# Patient Record
Sex: FEMALE | Race: WHITE | Employment: FULL TIME | URBAN - METROPOLITAN AREA
[De-identification: names, ages, dates, MRNs, and addresses within clinical notes are randomized per-mention and may not be internally consistent; named-entity substitution may affect disease eponyms.]

---

## 2017-05-15 ENCOUNTER — EMPLOYEE WELLNESS (OUTPATIENT)
Dept: OTHER | Age: 52
End: 2017-05-15

## 2017-05-15 LAB
CHOLESTEROL/HDL RATIO: 3.2
CHOLESTEROL: 176 MG/DL
GLUCOSE BLD-MCNC: 104 MG/DL (ref 70–99)
HDLC SERPL-MCNC: 55 MG/DL
LDL CHOLESTEROL: 101 MG/DL (ref 0–130)
PATIENT FASTING?: ABNORMAL
TRIGL SERPL-MCNC: 101 MG/DL
VLDLC SERPL CALC-MCNC: ABNORMAL MG/DL (ref 1–30)

## 2018-02-26 ENCOUNTER — HOSPITAL ENCOUNTER (OUTPATIENT)
Dept: WOMENS IMAGING | Age: 53
Discharge: HOME | End: 2018-02-28
Payer: COMMERCIAL

## 2018-02-26 DIAGNOSIS — Z12.39 SCREENING BREAST EXAMINATION: ICD-10-CM

## 2018-02-26 PROCEDURE — 77067 SCR MAMMO BI INCL CAD: CPT

## 2018-03-20 VITALS — BODY MASS INDEX: 26.31 KG/M2 | WEIGHT: 163 LBS

## 2018-05-02 ENCOUNTER — EMPLOYEE WELLNESS (OUTPATIENT)
Dept: OTHER | Age: 53
End: 2018-05-02

## 2018-05-02 LAB
CHOLESTEROL/HDL RATIO: 2.9
CHOLESTEROL: 176 MG/DL
GLUCOSE BLD-MCNC: 106 MG/DL (ref 70–99)
HDLC SERPL-MCNC: 60 MG/DL
LDL CHOLESTEROL: 105 MG/DL (ref 0–130)
PATIENT FASTING?: YES
TRIGL SERPL-MCNC: 57 MG/DL
VLDLC SERPL CALC-MCNC: ABNORMAL MG/DL (ref 1–30)

## 2018-05-07 VITALS — WEIGHT: 151 LBS | BODY MASS INDEX: 24.37 KG/M2

## 2019-03-07 ENCOUNTER — HOSPITAL ENCOUNTER (OUTPATIENT)
Age: 54
Discharge: HOME | End: 2019-03-07
Payer: COMMERCIAL

## 2019-03-29 ENCOUNTER — EMPLOYEE WELLNESS (OUTPATIENT)
Dept: OTHER | Age: 54
End: 2019-03-29

## 2019-03-29 LAB
CHOLESTEROL/HDL RATIO: 2.9
CHOLESTEROL: 184 MG/DL
GLUCOSE BLD-MCNC: 98 MG/DL (ref 70–99)
HDLC SERPL-MCNC: 63 MG/DL
LDL CHOLESTEROL: 104 MG/DL (ref 0–130)
PATIENT FASTING?: YES
TRIGL SERPL-MCNC: 86 MG/DL
VLDLC SERPL CALC-MCNC: NORMAL MG/DL (ref 1–30)

## 2019-04-08 VITALS — BODY MASS INDEX: 26.63 KG/M2 | WEIGHT: 165 LBS

## 2019-06-09 PROBLEM — H25.11 NUCLEAR SCLEROTIC CATARACT OF RIGHT EYE: Status: ACTIVE | Noted: 2019-06-09

## 2019-06-09 NOTE — H&P
Audelia Lennox is a 53y.o. year old who presents for elective outpatient ophthalmic surgery with John Talbot. The patient complains of decreased vision, glare and halos around lights, and trouble with vision for activities of daily living. Past Medical History:   Diagnosis Date    Allergic rhinitis     Diverticulitis 2011    PVC (premature ventricular contraction)     Urinary incontinence        Past Surgical History:   Procedure Laterality Date    BREAST SURGERY      LUMP REMOVED FROM RIGHT BREAST    COLONOSCOPY  2011    DILATION AND CURETTAGE OF UTERUS      ENDOMETRIAL ABLATION      INCONTINENCE SURGERY      TONSILLECTOMY  1971    TUBAL LIGATION         Home meds:   Prior to Admission medications    Medication Sig Start Date End Date Taking? Authorizing Provider   predniSONE (DELTASONE) 10 MG tablet Prednisone 10mg tablets: Take 4 tablets by mouth (40mg) on days 1-2. Take 3 tablets by mouth (30mg) on days 3-4. Take 2 tablets by mouth (20mg) on days 5-6. Take 1 tablets by mouth (10mg) on days 6-7.     Dispense 20. 5/31/16   ELIAS Matthew CNP   naproxen (NAPROSYN) 500 MG tablet Take 1 tablet by mouth 2 times daily (with meals) for 7 days 1/14/16 1/21/16  ELIAS Knight CNP   Omega-3 Fatty Acids (FISH OIL) 1200 MG CAPS Take 1 tablet by mouth daily    Historical Provider, MD   magnesium gluconate (MAGONATE) 500 MG tablet Take 500 mg by mouth daily    Historical Provider, MD   metoprolol (TOPROL XL) 50 MG XL tablet Take 1 tablet by mouth daily 11/3/15   Yecenia Song MD   fluticasone [facility]) 50 MCG/ACT nasal spray 2 sprays by Nasal route daily 9/15/15   Yecenia Song MD   montelukast (SINGULAIR) 10 MG tablet Take 1 tablet by mouth nightly 9/15/15   Yecenia Song MD   omeprazole (PRILOSEC) 40 MG capsule Take 40 mg by mouth daily    Historical Provider, MD   Calcium Citrate-Vitamin D (CITRACAL MAXIMUM PO) Take 1 tablet by mouth

## 2019-06-10 ENCOUNTER — HOSPITAL ENCOUNTER (OUTPATIENT)
Age: 54
Setting detail: OUTPATIENT SURGERY
Discharge: HOME | End: 2019-06-10
Attending: OPHTHALMOLOGY | Admitting: OPHTHALMOLOGY
Payer: COMMERCIAL

## 2019-06-10 VITALS
DIASTOLIC BLOOD PRESSURE: 72 MMHG | TEMPERATURE: 98.6 F | SYSTOLIC BLOOD PRESSURE: 121 MMHG | HEIGHT: 66 IN | WEIGHT: 158 LBS | BODY MASS INDEX: 25.39 KG/M2 | OXYGEN SATURATION: 98 % | HEART RATE: 49 BPM | RESPIRATION RATE: 18 BRPM

## 2019-06-10 PROBLEM — H25.11 NUCLEAR SCLEROTIC CATARACT OF RIGHT EYE: Status: RESOLVED | Noted: 2019-06-09 | Resolved: 2019-06-10

## 2019-06-10 PROCEDURE — 2580000003 HC RX 258: Performed by: OPHTHALMOLOGY

## 2019-06-10 PROCEDURE — 6360000002 HC RX W HCPCS: Performed by: OPHTHALMOLOGY

## 2019-06-10 PROCEDURE — 3600000013 HC SURGERY LEVEL 3 ADDTL 15MIN: Performed by: OPHTHALMOLOGY

## 2019-06-10 PROCEDURE — 7100000010 HC PHASE II RECOVERY - FIRST 15 MIN: Performed by: OPHTHALMOLOGY

## 2019-06-10 PROCEDURE — 2709999900 HC NON-CHARGEABLE SUPPLY: Performed by: OPHTHALMOLOGY

## 2019-06-10 PROCEDURE — 6370000000 HC RX 637 (ALT 250 FOR IP): Performed by: OPHTHALMOLOGY

## 2019-06-10 PROCEDURE — 3600000003 HC SURGERY LEVEL 3 BASE: Performed by: OPHTHALMOLOGY

## 2019-06-10 PROCEDURE — 99152 MOD SED SAME PHYS/QHP 5/>YRS: CPT | Performed by: OPHTHALMOLOGY

## 2019-06-10 PROCEDURE — V2632 POST CHMBR INTRAOCULAR LENS: HCPCS | Performed by: OPHTHALMOLOGY

## 2019-06-10 PROCEDURE — 99153 MOD SED SAME PHYS/QHP EA: CPT | Performed by: OPHTHALMOLOGY

## 2019-06-10 PROCEDURE — 7100000011 HC PHASE II RECOVERY - ADDTL 15 MIN: Performed by: OPHTHALMOLOGY

## 2019-06-10 DEVICE — IMPLANTABLE DEVICE: Type: IMPLANTABLE DEVICE | Site: EYE | Status: FUNCTIONAL

## 2019-06-10 RX ORDER — PHENYLEPHRINE HCL 2.5 %
1 DROPS OPHTHALMIC (EYE) SEE ADMIN INSTRUCTIONS
Status: DISCONTINUED | OUTPATIENT
Start: 2019-06-10 | End: 2019-06-10 | Stop reason: HOSPADM

## 2019-06-10 RX ORDER — TETRACAINE HYDROCHLORIDE 5 MG/ML
SOLUTION OPHTHALMIC PRN
Status: DISCONTINUED | OUTPATIENT
Start: 2019-06-10 | End: 2019-06-10 | Stop reason: ALTCHOICE

## 2019-06-10 RX ORDER — SODIUM CHLORIDE 9 MG/ML
INJECTION, SOLUTION INTRAVENOUS CONTINUOUS
Status: DISCONTINUED | OUTPATIENT
Start: 2019-06-10 | End: 2019-06-10 | Stop reason: HOSPADM

## 2019-06-10 RX ORDER — TROPICAMIDE 10 MG/ML
1 SOLUTION/ DROPS OPHTHALMIC SEE ADMIN INSTRUCTIONS
Status: DISCONTINUED | OUTPATIENT
Start: 2019-06-10 | End: 2019-06-10 | Stop reason: HOSPADM

## 2019-06-10 RX ORDER — MIDAZOLAM HYDROCHLORIDE 1 MG/ML
INJECTION INTRAMUSCULAR; INTRAVENOUS PRN
Status: DISCONTINUED | OUTPATIENT
Start: 2019-06-10 | End: 2019-06-10 | Stop reason: ALTCHOICE

## 2019-06-10 RX ORDER — TETRACAINE HYDROCHLORIDE 5 MG/ML
1 SOLUTION OPHTHALMIC SEE ADMIN INSTRUCTIONS
Status: DISCONTINUED | OUTPATIENT
Start: 2019-06-10 | End: 2019-06-10 | Stop reason: HOSPADM

## 2019-06-10 RX ORDER — FENTANYL CITRATE 50 UG/ML
INJECTION, SOLUTION INTRAMUSCULAR; INTRAVENOUS PRN
Status: DISCONTINUED | OUTPATIENT
Start: 2019-06-10 | End: 2019-06-10 | Stop reason: ALTCHOICE

## 2019-06-10 RX ADMIN — PHENYLEPHRINE HYDROCHLORIDE 1 DROP: 25 SOLUTION/ DROPS OPHTHALMIC at 09:14

## 2019-06-10 RX ADMIN — SODIUM CHLORIDE: 9 INJECTION, SOLUTION INTRAVENOUS at 09:13

## 2019-06-10 RX ADMIN — PHENYLEPHRINE HYDROCHLORIDE 1 DROP: 25 SOLUTION/ DROPS OPHTHALMIC at 09:08

## 2019-06-10 RX ADMIN — TROPICAMIDE 1 DROP: 10 SOLUTION/ DROPS OPHTHALMIC at 09:14

## 2019-06-10 RX ADMIN — TROPICAMIDE 1 DROP: 10 SOLUTION/ DROPS OPHTHALMIC at 09:10

## 2019-06-10 ASSESSMENT — PAIN SCALES - GENERAL
PAINLEVEL_OUTOF10: 0
PAINLEVEL_OUTOF10: 0

## 2019-06-10 NOTE — OP NOTE
OPERATIVE NOTE      Patient:  Kaushal Mcclendon    Account #- [de-identified]    YOB: 1965    Date:  6/10/2019    Surgeon:  Trung Gong MD    Primary Care Physician:Phill Edwards MD      Preoperative Diagnosis: Nuclear Sclerotic Cataract- right eye    Postoperative Diagnosis: Same    Procedure: Phacoemulsification with intraocular lens implantation, right eye    Anesthesia: Topical anesthesia with intravenous sedation    Complications: none    Specimens: none    Indications for procedure: The patient is a 53y.o. year old female with decreased vision, glare and halos around lights, and trouble with activities of daily living. Examination revealed a visually significant cataract in the right eye. Other eye diseases have been ruled out as the primary cause of decreased visual acuity. Significant improvement in the patient's visual acuity and/or visual function is expected from the removal of the cataract. Risks, benefits, and alternatives to surgery were discussed with the patient and the patient elected to proceed with phacoemulsification with lens implantation. Details of the procedure: Following informed consent, the patient was identified by name and identification tag in the holding area,taken to the operating room and placed in the supine position. A time out was performed by all present in the room to identify the patient, the eye to be operated on, the correct operative procedure, and the correct intraocular lens. Monitoring leads were placed. The patient was positioned. An eyelid prep of half-strength Betadine was performed. The patient was administered intravenous sedation if desired and topical anesthetic was placed in the operative eye. The eye prepped a second time and draped in the usual sterile fashion using aseptic technique for cataract surgery. A lid speculum was then inserted. [place] was placed onto the corneal surface.   A stab incision was made using a disposable

## 2019-06-10 NOTE — INTERVAL H&P NOTE
H&P Update    Patient's History and Physical from June 9, 2019 was reviewed. Patient examined. There has been no change.     Aj Lim

## 2020-04-16 ENCOUNTER — OFFICE VISIT (OUTPATIENT)
Dept: PRIMARY CARE CLINIC | Age: 55
End: 2020-04-16
Payer: COMMERCIAL

## 2020-04-16 ENCOUNTER — HOSPITAL ENCOUNTER (OUTPATIENT)
Age: 55
Setting detail: SPECIMEN
Discharge: HOME | End: 2020-04-16
Payer: COMMERCIAL

## 2020-04-16 VITALS
WEIGHT: 167 LBS | DIASTOLIC BLOOD PRESSURE: 86 MMHG | SYSTOLIC BLOOD PRESSURE: 132 MMHG | BODY MASS INDEX: 26.95 KG/M2 | TEMPERATURE: 99.8 F | HEART RATE: 76 BPM | OXYGEN SATURATION: 97 %

## 2020-04-16 PROCEDURE — U0002 COVID-19 LAB TEST NON-CDC: HCPCS

## 2020-04-16 PROCEDURE — 99214 OFFICE O/P EST MOD 30 MIN: CPT | Performed by: NURSE PRACTITIONER

## 2020-04-16 NOTE — PATIENT INSTRUCTIONS
Kalina Her SURVEY:    You may be receiving a survey from [employer] regarding your visit today. Please complete the survey to enable us to provide the highest quality of care to you and your family. If you cannot score us a very good on any question, please call the office to discuss how we could have made your experience a very good one. Thank you. Advance Care Planning  People with COVID-19 may have no symptoms, mild symptoms, such as fever, cough, and shortness of breath or they may have more severe illness, developing severe and fatal pneumonia. As a result, Advance Care Planning with attention to naming a health care decision maker (someone you trust to make healthcare decisions for you if you could not speak for yourself) and sharing other health care preferences is important BEFORE a possible health crisis. Please contact your Primary Care Provider to discuss Advance Care Planning. Preventing the Spread of Coronavirus Disease 2019 in Homes and Residential Communities  For the most recent information go to [email or web address]    Prevention steps for People with confirmed or suspected COVID-19 (including persons under investigation) who do not need to be hospitalized  and   People with confirmed COVID-19 who were hospitalized and determined to be medically stable to go home    Your healthcare provider and public health staff will evaluate whether you can be cared for at home. If it is determined that you do not need to be hospitalized and can be isolated at home, you will be monitored by staff from your local or state health department. You should follow the prevention steps below until a healthcare provider or local or state health department says you can return to your normal activities. Stay home except to get medical care  People who are mildly ill with COVID-19 are able to isolate at home during their illness.  You should restrict activities

## 2020-04-16 NOTE — PROGRESS NOTES
Rangel [facility]  1965    Chief Complaint   Patient presents with    Fever     Patient complains of fever up to 101 x 2 days. 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     Fatigue     Patient complains of fatigue x 2 days.  Cough     Patient complains of dry cough.  Generalized Body Aches     Patient complains of body aches. Respiratory Symptoms:  Patient complains of 2 day(s) history of fever: 100.5-101.9, myalgias/arthralgias, post nasal drip, sneezing, chest pain: she said mid chest pain into back , non-productive cough and diarrhea. Symptoms have been unchanged with time. She denies any other symptoms . Relevant PMH: allergic rhinitis. Smoking history:  She  reports that she has never smoked. She has never used smokeless tobacco.     She has had ill contacts with positive Covid patients, she is a respiratory therapist at [employer]. Treatment to date: Acetaminophen. Travel screen completed:  Yes        HPI: Works as a respiratory therapist at [employer].  Exposed last Thursday (4/09/2020) in a room >1 hour with active intubation occurring for a lab confirmed covid positive patient. Did wear Papr lackey and all other PPE. Previously was exposed to positive Covid 19 approximately one month ago wearing N95 and a face shield. Symptoms began over the last 2 days with fevers measuring 101, body aches, dry coughing and a change in sense of taste. She reports symptoms seem to be worsening. Using Tylenol with good results. Vitals:    04/16/20 1348   BP: 132/86   Pulse: 76   Temp: 99.8 °F (37.7 °C)   TempSrc: Oral   SpO2: 97%   Weight: 167 lb (75.8 kg)      Physical Exam  Vitals signs and nursing note reviewed. Constitutional:       Comments: Appears to be of stated age with warm, dry skin; normal coloration without rash of the exposed skin. Patient is well-appearing, well-hydrated, and appears nontoxic without apparent distress.      HENT:

## 2020-04-17 ENCOUNTER — TELEPHONE (OUTPATIENT)
Dept: FAMILY MEDICINE CLINIC | Age: 55
End: 2020-04-17

## 2020-04-17 LAB
SARS-COV-2, PCR: NORMAL
SARS-COV-2: NOT DETECTED
SOURCE: NORMAL

## 2020-04-18 ENCOUNTER — CARE COORDINATION (OUTPATIENT)
Dept: CASE MANAGEMENT | Age: 55
End: 2020-04-18

## 2020-04-20 ENCOUNTER — HOSPITAL ENCOUNTER (OUTPATIENT)
Age: 55
Setting detail: SPECIMEN
Discharge: HOME | End: 2020-04-20
Payer: COMMERCIAL

## 2020-04-20 ENCOUNTER — OFFICE VISIT (OUTPATIENT)
Dept: PRIMARY CARE CLINIC | Age: 55
End: 2020-04-20
Payer: COMMERCIAL

## 2020-04-20 VITALS
WEIGHT: 167 LBS | SYSTOLIC BLOOD PRESSURE: 123 MMHG | TEMPERATURE: 99.9 F | BODY MASS INDEX: 26.95 KG/M2 | OXYGEN SATURATION: 96 % | HEART RATE: 74 BPM | DIASTOLIC BLOOD PRESSURE: 81 MMHG

## 2020-04-20 PROCEDURE — U0002 COVID-19 LAB TEST NON-CDC: HCPCS

## 2020-04-20 PROCEDURE — 99213 OFFICE O/P EST LOW 20 MIN: CPT | Performed by: NURSE PRACTITIONER

## 2020-04-20 RX ORDER — AZITHROMYCIN 250 MG/1
TABLET, FILM COATED ORAL
Qty: 6 TABLET | Refills: 0 | Status: SHIPPED | OUTPATIENT
Start: 2020-04-20 | End: 2021-11-19

## 2020-04-20 NOTE — PROGRESS NOTES
Brandy Mei  1965    Chief Complaint   Patient presents with    Fever     pt still having symptoms, is here for retest for Covid-19, last does of tyelnol was about 3-4 hours ago. Respiratory Symptoms:  Patient complains of 1 week(s) history of fever: 100.5-101.9, myalgias/arthralgias, headache, non-productive cough and diarrhea. Symptoms have been unchanged with time. She denies any other symptoms . Relevant PMH: allergic rhinitis. Smoking history:  She  reports that she has never smoked. She has never used smokeless tobacco.     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 Treatment to date: Acetaminophen. Travel screen completed:  Yes        HPI: Katie Wilkins is a 48-year-old female who presents to clinic with concern for continued fever, cough and increasing fatigue. Katie Wilkins was initially evaluated and tested at this clinic for COVID-19 on April 17 after she began experiencing rapid onset of symptoms including fever, dry cough and fatigue. Bill Singh reports that she was exposed to a known Covid positive patient while at work at [employer] during an active inpatient. She reports that she was wearing her Papr lackey and all her other PPE. She is currently in home based quarantine and off work. Katie Wilkins reports continued dry cough that seems to be increasing in harshness and frequency. Fevers (up to 101) have continued even with Tylenol. She reports times of feeling better, but as soon as she attempts to increase activity and Tylenol wears off she returns to feeling bad. No SOB. She has a follow up appointment with PCP schedule via virtual tomorrow with her PCP.       Vitals:    04/20/20 0921   BP: 123/81   Site: Right Upper Arm   Position: Sitting   Cuff Size: Medium Adult   Pulse: 74   Temp: 99.9 °F (37.7 °C)   TempSrc: Oral   SpO2: 96%   Weight: 167 lb (75.8 kg)      Physical Exam  Constitutional:       General: She is not in acute distress. Appearance: She is ill-appearing. She is not toxic-appearing or diaphoretic. HENT:      Nose: Nose normal.   Cardiovascular:      Rate and Rhythm: Normal rate and regular rhythm. Heart sounds: Normal heart sounds. Pulmonary:      Effort: Pulmonary effort is normal. No respiratory distress. Breath sounds: Rales (minimal; end inspriatory, right mid posterior ) present. No rhonchi. Comments: Frequent dry cough  Chest:      Chest wall: No tenderness. Neurological:      Mental Status: She is alert. MDM:   Sadia Crow is a 63 y.o. female presenting to clinic with concern for COVID 19 exposure and continued cough with fevers. Reviewed and discussed HPI, exam findings and plan of care. Sadia Crow appears nontoxic, well hydrated and well appearing. Due to continued symptoms and concern for a false negative Covid 19 test, will proceed with testing and home based isolation with phone follow up by this clinic in 24-48 hours. Rales on exam today; treating empirically with Azithromycin for concern of CAP. Holding CXR as she is stable and without distress. Discussed and encouraged continued supportive management including good oral hydration, rest and Tylenol for fever and body aches as OTC packaging labelling. Discussed strict follow up precautions to ED for any worsening and or concerns including SOB. Continue virtual visit with PCP as scheduled. Person under investigation (PUI) form was previously completed to be sent. Assessment/Plan:    1. Walking pneumonia  - azithromycin (ZITHROMAX) 250 MG tablet; 2 tablets (500 mg) by mouth on day one.  1 tablet (250 mg) by mouth on days 2-5. Dispense: 6 tablet; Refill: 0. Administration side effects reviewed. Advised to take with food and a probiotic and examples were provided.     2. Exposure to COVID-19 virus  - COVID-19; Future      ELIAS Araujo - CNP  4/20/20     This visit was provided as a focused evaluation during the COVID -19 pandemic/national emergency. A comprehensive review of all previous patient history and testing was not conducted. Pertinent findings were elicited during the visit.

## 2020-04-21 ENCOUNTER — TELEMEDICINE (OUTPATIENT)
Dept: FAMILY MEDICINE CLINIC | Age: 55
End: 2020-04-21
Payer: COMMERCIAL

## 2020-04-21 PROCEDURE — 99213 OFFICE O/P EST LOW 20 MIN: CPT | Performed by: NURSE PRACTITIONER

## 2020-04-21 ASSESSMENT — ENCOUNTER SYMPTOMS
SHORTNESS OF BREATH: 1
DIARRHEA: 0
NAUSEA: 0
COUGH: 1
CONSTIPATION: 0

## 2020-04-21 NOTE — PROGRESS NOTES
tablet Take 500 mg by mouth daily  Historical Provider, MD   fluticasone (FLONASE) 50 MCG/ACT nasal spray 2 sprays by Nasal route daily  Patient not taking: Reported on 4/16/2020  Leah Kaba MD   Calcium Citrate-Vitamin D (CITRACAL MAXIMUM PO) Take 1 tablet by mouth daily  Historical Provider, MD       Social History     Tobacco Use    Smoking status: Never Smoker    Smokeless tobacco: Never Used   Substance Use Topics    Alcohol use: Yes     Comment: RARE    Drug use: No        Allergies   Allergen Reactions    Fish-Derived Products Swelling     Perch only      Epinephrine     Racemic Amphetamine [Amphetamine] Other (See Comments)     Tachycardia & cough   ,   Health Maintenance   Topic Date Due    Hepatitis C screen  1965    HIV screen  03/18/1979    DTaP/Tdap/Td vaccine (1 - Tdap) 03/18/1983    Cervical cancer screen  10/01/2013    Shingles Vaccine (1 of 2) 03/18/2014    A1C test (Diabetic or Prediabetic)  09/18/2014    Breast cancer screen  02/26/2020    Colon cancer screen colonoscopy  04/06/2021    Lipid screen  03/29/2024    Flu vaccine  Completed    Hepatitis A vaccine  Aged Out    Hepatitis B vaccine  Aged Out    Hib vaccine  Aged Out    Meningococcal (ACWY) vaccine  Aged Out    Pneumococcal 0-64 years Vaccine  Aged Out       PHYSICAL EXAMINATION:  [ INSTRUCTIONS:  \"[x]\" Indicates a positive item  \"[]\" Indicates a negative item  -- DELETE ALL ITEMS NOT EXAMINED]  Vital Signs: (As obtained by patient/caregiver or practitioner observation)    Blood pressure-  Heart rate-    Respiratory rate-    Temperature-  Pulse oximetry-     Constitutional: [x] Appears well-developed and well-nourished [x] No apparent distress      [] Abnormal-   Mental status  [x] Alert and awake  [x] Oriented to person/place/time []Able to follow commands      Eyes:  EOM    []  Normal  [] Abnormal-  Sclera  []  Normal  [] Abnormal -         Discharge []  None visible  [] Abnormal -    HENT:   []

## 2020-04-22 ENCOUNTER — CARE COORDINATION (OUTPATIENT)
Dept: CASE MANAGEMENT | Age: 55
End: 2020-04-22

## 2020-04-22 LAB
SARS-COV-2, PCR: DETECTED
SARS-COV-2: ABNORMAL
SOURCE: ABNORMAL

## 2020-04-23 ENCOUNTER — CARE COORDINATION (OUTPATIENT)
Dept: CARE COORDINATION | Age: 55
End: 2020-04-23

## 2020-04-23 NOTE — CARE COORDINATION
Yellow alert noted in remote COVID-19 Loop Symptom Monitoring Program. Patient was messaged yesterday, 4/22, to notify Loida Green if symptoms have worsened since yesterday. Loop Conversation:  4/22Srikanth Issa, 2:30 PM  I am COVID positive with pneumonia. I've been sick for the past week. I was started on a Z Pack this past monday and I'm starting to improve. reply     Elmer Carroll LPN, 3:92 PM  Hi, I'm Alyssa Richardson LPN from the [address] [place] [address],[address] [facility] Loop Care Team. I see you alerted about having Low Energy Level. Thank you for the update. Is your energy level the same or worse than yesterday?

## 2020-04-25 ENCOUNTER — CARE COORDINATION (OUTPATIENT)
Dept: OTHER | Facility: CLINIC | Age: 55
End: 2020-04-25

## 2020-04-25 NOTE — CARE COORDINATION
Patient commented in LOOP: My energy level is worse than yesterday. Have a sinus headache today. I took two short walkes in the yard yesterday and that really wiped me out. I don't feel I've recovered from it yet. RN: Oral Tello, thank you for reaching out to us. Please go ahead and get some rest today and see if that will help with those symptoms. If they worsen please let us know or contact your provider. Have a great day!

## 2020-04-26 ENCOUNTER — CARE COORDINATION (OUTPATIENT)
Dept: OTHER | Facility: CLINIC | Age: 55
End: 2020-04-26

## 2020-04-27 ENCOUNTER — CARE COORDINATION (OUTPATIENT)
Dept: OTHER | Facility: CLINIC | Age: 55
End: 2020-04-27

## 2020-04-28 ENCOUNTER — CARE COORDINATION (OUTPATIENT)
Dept: OTHER | Facility: CLINIC | Age: 55
End: 2020-04-28

## 2020-04-29 ENCOUNTER — HOSPITAL ENCOUNTER (OUTPATIENT)
Age: 55
Setting detail: SPECIMEN
Discharge: HOME | End: 2020-04-29
Payer: COMMERCIAL

## 2020-04-29 PROCEDURE — U0002 COVID-19 LAB TEST NON-CDC: HCPCS

## 2020-04-30 LAB
SARS-COV-2, PCR: NORMAL
SARS-COV-2, RAPID: NORMAL
SARS-COV-2: NOT DETECTED
SOURCE: NORMAL

## 2020-05-01 ENCOUNTER — HOSPITAL ENCOUNTER (OUTPATIENT)
Age: 55
Setting detail: SPECIMEN
Discharge: HOME | End: 2020-05-01
Payer: COMMERCIAL

## 2020-05-01 PROCEDURE — U0003 INFECTIOUS AGENT DETECTION BY NUCLEIC ACID (DNA OR RNA); SEVERE ACUTE RESPIRATORY SYNDROME CORONAVIRUS 2 (SARS-COV-2) (CORONAVIRUS DISEASE [COVID-19]), AMPLIFIED PROBE TECHNIQUE, MAKING USE OF HIGH THROUGHPUT TECHNOLOGIES AS DESCRIBED BY CMS-2020-01-R: HCPCS

## 2020-05-04 LAB — SARS-COV-2, NAA: NOT DETECTED

## 2020-05-05 ENCOUNTER — TELEPHONE (OUTPATIENT)
Dept: PRIMARY CARE CLINIC | Age: 55
End: 2020-05-05

## 2020-05-05 ENCOUNTER — TELEPHONE (OUTPATIENT)
Dept: FAMILY MEDICINE CLINIC | Age: 55
End: 2020-05-05

## 2021-07-15 LAB
CHOLESTEROL/HDL RATIO: 2.7
CHOLESTEROL: 172 MG/DL
GLUCOSE BLD-MCNC: 93 MG/DL (ref 70–99)
HDLC SERPL-MCNC: 63 MG/DL
LDL CHOLESTEROL: 95 MG/DL (ref 0–130)
PATIENT FASTING?: YES
TRIGL SERPL-MCNC: 69 MG/DL
VLDLC SERPL CALC-MCNC: NORMAL MG/DL (ref 1–30)

## 2021-11-19 ENCOUNTER — OFFICE VISIT (OUTPATIENT)
Dept: PRIMARY CARE CLINIC | Age: 56
End: 2021-11-19
Payer: COMMERCIAL

## 2021-11-19 VITALS
DIASTOLIC BLOOD PRESSURE: 93 MMHG | WEIGHT: 172 LBS | HEART RATE: 79 BPM | OXYGEN SATURATION: 98 % | BODY MASS INDEX: 27.64 KG/M2 | HEIGHT: 66 IN | TEMPERATURE: 98 F | RESPIRATION RATE: 20 BRPM | SYSTOLIC BLOOD PRESSURE: 131 MMHG

## 2021-11-19 DIAGNOSIS — T50.B95A ADVERSE REACTION TO COVID-19 VACCINE: Primary | ICD-10-CM

## 2021-11-19 DIAGNOSIS — L30.9 HAND DERMATITIS: ICD-10-CM

## 2021-11-19 PROCEDURE — 99213 OFFICE O/P EST LOW 20 MIN: CPT | Performed by: NURSE PRACTITIONER

## 2021-11-19 PROCEDURE — 3017F COLORECTAL CA SCREEN DOC REV: CPT | Performed by: NURSE PRACTITIONER

## 2021-11-19 PROCEDURE — G8484 FLU IMMUNIZE NO ADMIN: HCPCS | Performed by: NURSE PRACTITIONER

## 2021-11-19 PROCEDURE — 1036F TOBACCO NON-USER: CPT | Performed by: NURSE PRACTITIONER

## 2021-11-19 PROCEDURE — G8427 DOCREV CUR MEDS BY ELIG CLIN: HCPCS | Performed by: NURSE PRACTITIONER

## 2021-11-19 PROCEDURE — G8419 CALC BMI OUT NRM PARAM NOF/U: HCPCS | Performed by: NURSE PRACTITIONER

## 2021-11-19 RX ORDER — PREDNISONE 20 MG/1
TABLET ORAL
Qty: 15 TABLET | Refills: 0 | Status: SHIPPED | OUTPATIENT
Start: 2021-11-19 | End: 2022-02-11 | Stop reason: ALTCHOICE

## 2021-11-19 NOTE — PATIENT INSTRUCTIONS
Patient Education        COVID-19 Vaccine: Care Instructions  Overview     The COVID-19 vaccine can help you avoid getting COVID-19, a disease caused by a type of coronavirus. COVID-19 can cause pneumonia and even death. You may need 1 or 2 doses of the vaccine. And you might need \"booster\" doses later to help you stay protected. It takes two weeks after your last dose to be fully protected from COVID-19. The vaccine prevents most cases of COVID-19. But if you do still catch COVID-19, your symptoms will probably be less severe than if you hadn't gotten the vaccine. You can't get COVID-19 from the vaccine. What are the side effects of the COVID-19 vaccine? You might not have side effects. But if you do, they'll probably be like those of other vaccines, including:  · Fever. · Soreness. · Feeling very tired. This is normal. Your body is building protection against COVID-19. You may also have other side effects, including:  · Chills. · Headache. · Pain, redness, a rash, or swelling in the arm where you had the vaccine. · Swollen lymph nodes in the armpit of the arm where you had the vaccine. · Nausea. Side effects will likely go away in a few days. Until then, it may be harder to do your usual activities. You may need 1 or 2 doses. If you get 2 doses, you may notice side effects more after the second dose. If you think you've been exposed to COVID-19 or have symptoms like a cough, trouble breathing, or a new loss of smell or taste, call your doctor. These aren't vaccine side effects. You may need a COVID-19 test.  Follow-up care is a key part of your treatment and safety. Be sure to make and go to all appointments, and call your doctor if you are having problems. It's also a good idea to know your test results and keep a list of the medicines you take. How can you care for yourself at home?   · If you have a sore arm or a fever after getting the COVID-19 vaccine, you can take an over-the-counter pain medicine, such as acetaminophen (Tylenol) or ibuprofen (Advil, Motrin). Read and follow all instructions on the label. Do not give aspirin to anyone younger than 20. It has been linked to Reye syndrome, a serious illness. · Put ice or a cold pack on the sore area for 10 to 20 minutes at a time. Put a thin cloth between the ice and your skin. · If you have side effects, such as a fever, be sure to get enough rest and drink plenty of fluids. When should you call for help? Call 911  anytime you think you may need emergency care. For example, call if after getting the COVID-19 vaccine:    · You have symptoms of a severe reaction to the vaccine. Symptoms of a severe reaction may include:  ? Severe difficulty breathing. ? Sudden raised, red areas (hives) all over your body. ? Severe lightheadedness. Call your doctor now or seek immediate medical care if:    · You have one or more of these symptoms within several weeks of getting a COVID-19 vaccine. ? Severe headache that does not go away. ? Blurred vision. ? Shortness of breath. ? Chest pain, or a feeling of a fast-beating, fluttering, or pounding heart. ? Abdominal pain that does not go away. ? Pain, redness, or swelling in the leg.  ? Bruising or tiny spots under the skin that's not near where you got the vaccine. Watch closely for changes in your health, and be sure to contact your doctor if you have any problems. Where can you learn more? Go to https://[employer]. org and sign in to your [employer] account. Enter C126 in the [employer]Wave box to learn more about \"COVID-19 Vaccine: Care Instructions. \"     If you do not have an account, please click on the \"Sign Up Now\" link. Current as of: July 1, 2021               Content Version: 13.0  © 2006-2021 [employer]. Care instructions adapted under license by [employer].  If you have questions about a medical condition or this instruction, always ask your healthcare professional. [place] [address] any warranty or liability for your use of this information. Patient Education        Side Effects of Medicine: Care Instructions  Your Care Instructions     Medicines are a big part of treatment for many health problems. But all medicines have side effects. Often these are mild problems. They might include a dry mouth or upset stomach. But sometimes medicines can cause dangerous side effects. One example is a bad allergic reaction. The best treatment will depend on what side effects you have. If you have a serious side effect, you may need to stop taking the medicine. You may also need to take another medicine to treat the side effect. If you have a mild side effect, it may go away after you take the medicine for a while. The doctor has checked you carefully, but problems can develop later. If you notice any problems or new symptoms, get medical treatment right away. Follow-up care is a key part of your treatment and safety. Be sure to make and go to all appointments, and call your doctor if you are having problems. It's also a good idea to know your test results and keep a list of the medicines you take. How can you care for yourself at home? · Be safe with medicines. Take your medicines exactly as prescribed. Call your doctor if you think you are having a problem with your medicine. · Call your doctor if side effects bother you and you wonder if you should keep taking a medicine. Your doctor may be able to lower your dose or change your medicine. Do not suddenly quit taking your medicine unless a doctor tells you to. · Make sure your doctor has a list of all the medicines, vitamins, supplements, and herbal remedies you take. Ask about side effects. When should you call for help?   Watch closely for changes in your health, and be sure to contact your doctor if:    · You think you are having a new problem with your medicine.     · You do not infection you already have. Tell your doctor about any illness or infection you've had within the past several weeks. Tell your doctor if you have ever had:  · heart problems, high blood pressure, or a heart attack;  · glaucoma or cataracts;  · herpes infection of the eyes;  · past or present tuberculosis;  · a parasite infection that causes diarrhea (such as threadworms);  · any illness that causes diarrhea;  · underactive thyroid;  · diabetes;  · a stomach ulcer, diverticulitis;  · a colostomy or ileostomy;  · osteoporosis or low bone mineral density (steroid medication can increase your risk of bone loss);  · low levels of calcium or potassium in your blood;  · cirrhosis or other liver disease;  · mental illness or psychosis; or  · a muscle disorder such as myasthenia gravis. Long-term use of steroids may lead to bone loss (osteoporosis), especially if you smoke or drink alcohol, if you do not exercise, or if you do not get enough vitamin D or calcium in your diet. It is not known whether this medicine will harm an unborn baby. Tell your doctor if you are pregnant or plan to become pregnant. You should not breastfeed while using prednisone. How should I take prednisone? Follow all directions on your prescription label and read all medication guides or instruction sheets. Your doctor may occasionally change your dose. Use the medicine exactly as directed. Prednisone is taken daily or every other day, depending on the condition being treated. You may need to take the medicine at a certain time of day. Follow your doctor's instructions about when and how often to take this medicine. Take with food if prednisone upsets your stomach. Measure liquid medicine carefully. Use the dosing syringe provided, or use a medicine dose-measuring device (not a kitchen spoon). Swallow the delayed-release tablet whole and do not crush, chew, or break it.   Prednisone can weaken (suppress) your immune system, and you may get an infection more easily. Call your doctor if you have signs of infection (fever, weakness, cold or flu symptoms, skin sores, diarrhea, frequent or recurring illness). If you have major surgery or a severe injury or infection, your prednisone dose needs may change. Make sure any doctor caring for you knows you are using this medicine. If you use this medicine long-term, you may need medical tests and vision exams. In case of emergency, wear or carry medical identification to let others know you use a steroid. You should not stop using prednisone suddenly. Follow your doctor's instructions about tapering your dose. Store at room temperature away from moisture, heat, and light. What happens if I miss a dose? Take the medicine as soon as you can, but skip the missed dose if it is almost time for your next dose. Do not take two doses at one time. What happens if I overdose? Seek emergency medical attention or call the Poison Help line at 1-697.191.3021. High doses or long-term use of prednisone can lead to thinning skin, easy bruising, changes in body fat (especially in your face, neck, back, and waist), increased acne or facial hair, menstrual problems, impotence, or loss of interest in sex. What should I avoid while taking prednisone? Do not receive a \"live\" vaccine while using prednisone. The vaccine may not work as well and may not fully protect you from disease. Live vaccines include measles, mumps, rubella (MMR), polio, rotavirus, typhoid, yellow fever, varicella (chickenpox), zoster (shingles), and nasal flu (influenza) vaccine. Avoid being near people who are sick or have infections. Call your doctor for preventive treatment if you are exposed to chickenpox or measles. These conditions can be serious or even fatal in people who are using steroid medicine. Avoid drinking alcohol. What are the possible side effects of prednisone?   Get emergency medical help if you have signs of an allergic reaction: hives; difficult breathing; swelling of your face, lips, tongue, or throat. Call your doctor at once if you have:  · muscle pain or weakness;  · blurred vision, tunnel vision, eye pain, or seeing halos around lights;  · severe depression, changes in personality, unusual thoughts or behavior;  · bloody or tarry stools, coughing up blood or vomit that looks like coffee grounds;  · swelling, rapid weight gain, feeling short of breath;  · irregular heartbeats;  · severe headache, pounding in your neck or ears;  · decreased adrenal gland hormones --muscle weakness, tiredness, diarrhea, nausea, menstrual changes, skin discoloration, craving salty foods, and feeling light-headed; or  · low potassium level --leg cramps, constipation, irregular heartbeats, fluttering in your chest, increased thirst or urination, numbness or tingling, muscle weakness or limp feeling. Prednisone can affect growth in children. Tell your doctor if your child is not growing at a normal rate while using this medicine. Common side effects may include:  · weight gain (especially in your face or your upper back and torso);  · increased appetite;  · mood changes, trouble sleeping;  · changes in your menstrual periods;  · problems with memory or thought;  · muscle or joint pain;  · weakness;  · headache, dizziness, spinning sensation;  · nausea, bloating, loss of appetite;  · slow wound healing; or  · acne, increased sweating, thinning skin, bruising, pinpoint spots under your skin. This is not a complete list of side effects and others may occur. Call your doctor for medical advice about side effects. You may report side effects to FDA at 6-520-FDA-0244. What other drugs will affect prednisone? Sometimes it is not safe to use certain medications at the same time. Some drugs can affect your blood levels of other drugs you take, which may increase side effects or make the medications less effective.   Tell your doctor about all your current medicines. Many drugs can affect prednisone, especially:  · bupropion;  · cyclosporine;  · digoxin;  · ketoconazole;  · an antibiotic;  · birth control pills or hormone replacement therapy;  · a diuretic or \"water pill\";  · insulin or oral diabetes medicine;  · a blood thinner --warfarin, Coumadin, Jantoven; or  · NSAIDs (nonsteroidal anti-inflammatory drugs) --aspirin, ibuprofen (Advil, Motrin), naproxen (Aleve), celecoxib, diclofenac, indomethacin, meloxicam, and others. This list is not complete and many other drugs may affect prednisone. This includes prescription and over-the-counter medicines, vitamins, and herbal products. Not all possible drug interactions are listed here. Where can I get more information? Your pharmacist can provide more information about prednisone. Remember, keep this and all other medicines out of the reach of children, never share your medicines with others, and use this medication only for the indication prescribed. Every effort has been made to ensure that the information provided by [address] [place], [place] [address]can Dr is accurate, up-to-date, and complete, but no guarantee is made to that effect. Drug information contained herein may be time sensitive. [facility] information has been compiled for use by healthcare practitioners and consumers in the [place] and therefore [facility] does not warrant that uses outside of the [place] are appropriate, unless specifically indicated otherwise. [facility]'s drug information does not endorse drugs, diagnose patients or recommend therapy. [facility] drug information is an informational resource designed to assist licensed healthcare practitioners in caring for their patients and/or to serve consumers viewing this service as a supplement to, and not a substitute for, the expertise, skill, knowledge and judgment of healthcare practitioners.  The absence of a warning for a given drug or drug combination in no way should be construed to indicate that the drug or drug combination is safe, effective or appropriate for any given patient. [facility] does not assume any responsibility for any aspect of healthcare administered with the aid of information [facility] provides. The information contained herein is not intended to cover all possible uses, directions, precautions, warnings, drug interactions, allergic reactions, or adverse effects. If you have questions about the drugs you are taking, check with your doctor, nurse or pharmacist.  Copyright 6866-2385 [address]. Version: 10.01. Revision date: 3/28/2019. Care instructions adapted under license by [facility] ([facility]). If you have questions about a medical condition or this instruction, always ask your healthcare professional. [place] [address] any warranty or liability for your use of this information.

## 2021-11-19 NOTE — PROGRESS NOTES
Chief Complaint:   Rash (Got covid vaccine on tues 11-16. Started feeling ithcy same day, all over hands/neck area. Pt is concerned about getting the 2nd covid shot)      History of Present Illness   Source of history provided by:  patient. Aaron Thompson is a 61 y.o. old female who has a past medical history of:   Past Medical History:   Diagnosis Date    Allergic rhinitis     Diverticulitis 2011    PVC (premature ventricular contraction)     Urinary incontinence     Presents to the walk in clinic for evaluation of a rash to the \"itches all over. It comes and goes since I got the vaccine. It's burning and itching\". According to Ana Lilia Carcamo, she received her first [employer] vaccination on Tuesday November 16,2021 at the [facility]. Wednesday, she was awakened from sleep with \"spiked a fever, all of my joints hurt, hands were burning and itchy\". She has tried over the counter Claritin without relief. Denies any known cause for the rash including any new soaps, detergents, lotions, foods, or medications. Since onset the symptoms have progressed. Reports associated erythema, mild burning, and pruritis. Denies any bleeding or drainage. Denies any lymphangitic streaking,  HA , dyspnea, dysphagia, recent illness, myalgias, vomiting, or lethargy. ROS   Unless otherwise stated in this report or unable to obtain because of the patient's clinical or mental status as evidenced by the medical record, this patients's positive and negative responses for Review of Systems, constitutional, psych, eyes, ENT, cardiovascular, respiratory, gastrointestinal, neurological, genitourinary, musculoskeletal, integument systems and systems related to the presenting problem are either stated in the preceding or were not pertinent or were negative for the symptoms and/or complaints related to the medical problem. Past Surgical History:  has a past surgical history that includes Breast surgery;  Tonsillectomy (1971); Tubal ligation; Dilation and curettage of uterus; Endometrial ablation; Incontinence surgery; Colonoscopy (2011); and Intracapsular cataract extraction (Right, 6/10/2019). Social History:  reports that she has never smoked. She has never used smokeless tobacco. She reports current alcohol use. She reports that she does not use drugs. Family History: family history includes Cancer in her mother; Diabetes in her mother; Heart Disease in her father. Allergies: Fish-derived products, Epinephrine, and Racemic amphetamine [amphetamine]    Physical Exam     VS:  BP (!) 131/93   Pulse 79   Temp 98 °F (36.7 °C)   Resp 20   Ht 5' 6\" (1.676 m)   Wt 172 lb (78 kg)   SpO2 98%   BMI 27.76 kg/m²        Constitutional:  Alert, development consistent with age. HEENT:  NC/NT. Airway patent. Eyes:  PERRL, EOMI, no discharge. Lungs:  CTAB, no wheezing, rales, or rhonchi  Heart:  RRR without pathologic murmurs  Skin:  Normal turgor and appropriately dry to touch. Erythematous, non raised, blanching rash noted over the bilateral palms and anterior wrists. Signs of excoriation noted but no signs of secondary infection including TTP, pustules, induration, or fluctuance. No bleeding or discharge noted. No lymphangitic streaking. Neurological:  Orientation age-appropriate unless noted elsewhere. Motor functions intact. Lab / Imaging Results   (All laboratory and radiology results have been personally reviewed by myself)  Labs:      Imaging: All Radiology results interpreted by Radiologist unless otherwise noted. Medical Decision Making   Pt non-toxic, in no apparent distress and stable at time of discharge. Assessment / Plan   Impression(s):  Rosie Abdalla was seen today for rash. Diagnoses and all orders for this visit:    Adverse reaction to COVID-19 vaccine  -     predniSONE (DELTASONE) 20 MG tablet; Take 3 tablets by mouth (60mg) x3 days. Take 2 tablets by mouth (40mg) days 4 and 5.   Take 1 tablets by mouth (20mg) days 6 and 7. Hand dermatitis      61y.o. year old female presenting with Covid-19 vaccination side effect. Treating today with prednisone taper,administraiton/side effects discussed. Avoid scratching to prevent the development of a secondary infection. F/u PCP and employee health in 3-5 days if symptoms persist. ED for any worsening or concerns.     Shan Laurent, ELIAS - CNP

## 2021-12-03 ENCOUNTER — OFFICE VISIT (OUTPATIENT)
Dept: FAMILY MEDICINE CLINIC | Age: 56
End: 2021-12-03
Payer: COMMERCIAL

## 2021-12-03 VITALS
OXYGEN SATURATION: 97 % | RESPIRATION RATE: 18 BRPM | DIASTOLIC BLOOD PRESSURE: 80 MMHG | HEART RATE: 80 BPM | HEIGHT: 66 IN | BODY MASS INDEX: 27.58 KG/M2 | SYSTOLIC BLOOD PRESSURE: 138 MMHG | WEIGHT: 171.6 LBS

## 2021-12-03 DIAGNOSIS — L29.8 PRURITIC ERYTHEMATOUS RASH: Primary | ICD-10-CM

## 2021-12-03 DIAGNOSIS — Z12.11 SCREENING FOR COLON CANCER: ICD-10-CM

## 2021-12-03 DIAGNOSIS — Z86.16 HISTORY OF COVID-19: ICD-10-CM

## 2021-12-03 DIAGNOSIS — J30.1 SEASONAL ALLERGIC RHINITIS DUE TO POLLEN: ICD-10-CM

## 2021-12-03 DIAGNOSIS — Z12.4 SCREENING FOR CERVICAL CANCER: ICD-10-CM

## 2021-12-03 DIAGNOSIS — Z00.00 ENCOUNTER FOR MEDICAL EXAMINATION TO ESTABLISH CARE: ICD-10-CM

## 2021-12-03 DIAGNOSIS — Z12.31 ENCOUNTER FOR SCREENING MAMMOGRAM FOR MALIGNANT NEOPLASM OF BREAST: ICD-10-CM

## 2021-12-03 DIAGNOSIS — T50.Z95D VACCINE REACTION, SUBSEQUENT ENCOUNTER: ICD-10-CM

## 2021-12-03 PROCEDURE — G8484 FLU IMMUNIZE NO ADMIN: HCPCS | Performed by: STUDENT IN AN ORGANIZED HEALTH CARE EDUCATION/TRAINING PROGRAM

## 2021-12-03 PROCEDURE — 99214 OFFICE O/P EST MOD 30 MIN: CPT | Performed by: STUDENT IN AN ORGANIZED HEALTH CARE EDUCATION/TRAINING PROGRAM

## 2021-12-03 PROCEDURE — G8419 CALC BMI OUT NRM PARAM NOF/U: HCPCS | Performed by: STUDENT IN AN ORGANIZED HEALTH CARE EDUCATION/TRAINING PROGRAM

## 2021-12-03 PROCEDURE — 1036F TOBACCO NON-USER: CPT | Performed by: STUDENT IN AN ORGANIZED HEALTH CARE EDUCATION/TRAINING PROGRAM

## 2021-12-03 PROCEDURE — G8427 DOCREV CUR MEDS BY ELIG CLIN: HCPCS | Performed by: STUDENT IN AN ORGANIZED HEALTH CARE EDUCATION/TRAINING PROGRAM

## 2021-12-03 PROCEDURE — 3017F COLORECTAL CA SCREEN DOC REV: CPT | Performed by: STUDENT IN AN ORGANIZED HEALTH CARE EDUCATION/TRAINING PROGRAM

## 2021-12-03 SDOH — ECONOMIC STABILITY: FOOD INSECURITY: WITHIN THE PAST 12 MONTHS, YOU WORRIED THAT YOUR FOOD WOULD RUN OUT BEFORE YOU GOT MONEY TO BUY MORE.: NEVER TRUE

## 2021-12-03 SDOH — ECONOMIC STABILITY: FOOD INSECURITY: WITHIN THE PAST 12 MONTHS, THE FOOD YOU BOUGHT JUST DIDN'T LAST AND YOU DIDN'T HAVE MONEY TO GET MORE.: NEVER TRUE

## 2021-12-03 ASSESSMENT — ENCOUNTER SYMPTOMS
BACK PAIN: 0
VOMITING: 0
ABDOMINAL PAIN: 0
COUGH: 0
SINUS PAIN: 0
RHINORRHEA: 0
NAUSEA: 0
WHEEZING: 0
DIARRHEA: 0

## 2021-12-03 ASSESSMENT — PATIENT HEALTH QUESTIONNAIRE - PHQ9
SUM OF ALL RESPONSES TO PHQ QUESTIONS 1-9: 0
SUM OF ALL RESPONSES TO PHQ9 QUESTIONS 1 & 2: 0
2. FEELING DOWN, DEPRESSED OR HOPELESS: 0
SUM OF ALL RESPONSES TO PHQ QUESTIONS 1-9: 0
1. LITTLE INTEREST OR PLEASURE IN DOING THINGS: 0
SUM OF ALL RESPONSES TO PHQ QUESTIONS 1-9: 0

## 2021-12-03 ASSESSMENT — SOCIAL DETERMINANTS OF HEALTH (SDOH): HOW HARD IS IT FOR YOU TO PAY FOR THE VERY BASICS LIKE FOOD, HOUSING, MEDICAL CARE, AND HEATING?: NOT HARD AT ALL

## 2021-12-03 NOTE — PROGRESS NOTES
HPI Notes    Name: Amelia Contreras  : 1965         Chief Complaint:     Chief Complaint   Patient presents with    New Patient     would like to see a provider [place] home     Immunizations     2016 recieved her first covid vaccine states he was extremly tired , felt itchy on hr chest and throat  states her hand hand hives on her hands        History of Present Illness:      HPI    This is a 61year old woman presenting to establish care with a new PCP. She is transitioning due to our office being closer to her home. Her previous PCP was Dr. Breanna Galdamez in [facility]. She has also seen Claudia Fuchs NP at the same office. This was about 4-5 years ago. Records are available. Her PMH includes only allergic rhinitis, which is currently treated with Claritin PRN to good effect. She has also had recurrent idiopathic hives and other \"allergic reactions\" \"randomly throughout the years\". She had once been treated with immunotherapy from All[facility], but this had been discontinued years ago. She describes facial, eye swelling from touching many various different things, naming walnuts in the yard, dust, mouse droppings etc.     Preventative: has had diagnostic colon scope about 12 years ago. She had one polyp removed at this time. Would like to do Cologuard. She has not done screening mammography for several years, and has never had an abnormal one. She would prefer to schedule this herself. She has had DM and lipid screening earlier this year, which was normal.     Her principle concern today is to follow up from her previous visit with walk-in for an adverse vaccine reaction. She had received a Pfizer COVID-19 vaccine on 2021 and shortly after developed a pruritic erythematous rash over her palms and volar wrists. In addition to these symptoms, she reported marked fatigue, arthralgias, elevation in temperature. She was treated at the walk in clinic with a prednisone taper.  Since then, she reports feeling better, but has occasionally had a recurrent itchy feeling over her chest, anterior throat. She reports that she coughs easier if she is \"running around at work\", but overall notes that she is improving each day. Additionally, she noted that when she performed a PFT on herself at work, her DLCO was 77%, (previous value 90-95%) and there was reportedly a 10% reduction in her FVC (still within normal range). Past Medical History:     Past Medical History:   Diagnosis Date    Allergic rhinitis     Diverticulitis 2011    PVC (premature ventricular contraction)     PVC (premature ventricular contraction)     Stress incontinence 5/28/2014    Urinary incontinence     Yeast vaginitis 5/28/2014      Reviewed all health maintenance requirements and ordered appropriate tests  Health Maintenance Due   Topic Date Due    Hepatitis C screen  Never done    HIV screen  Never done    DTaP/Tdap/Td vaccine (1 - Tdap) Never done    Cervical cancer screen  Never done    Shingles Vaccine (1 of 2) Never done    Breast cancer screen  02/26/2020    Colon cancer screen colonoscopy  04/06/2021    Flu vaccine (1) 09/01/2021    COVID-19 Vaccine (2 - Pfizer 3-dose booster series) 12/07/2021       Past Surgical History:     Past Surgical History:   Procedure Laterality Date    BREAST SURGERY      LUMP REMOVED FROM RIGHT BREAST    COLONOSCOPY  2011    DILATION AND CURETTAGE OF UTERUS      ENDOMETRIAL ABLATION      INCONTINENCE SURGERY      INTRACAPSULAR CATARACT EXTRACTION Right 6/10/2019    EYE CATARACT EMULSIFICATION IOL IMPLANT performed by Jakub Starr MD at [id number] [place] [address]          Medications:       Prior to Admission medications    Medication Sig Start Date End Date Taking?  Authorizing Provider   Probiotic Product (PROBIOTIC DAILY PO)  4/1/20  Yes Historical Provider, MD   magnesium gluconate (MAGONATE) 500 MG tablet Take 500 mg by mouth daily   Yes Historical Provider, MD ear canal and external ear normal. There is no impacted cerumen. Left Ear: Tympanic membrane, ear canal and external ear normal. There is no impacted cerumen. Nose: Nose normal.      Mouth/Throat:      Mouth: Mucous membranes are moist.      Pharynx: Oropharynx is clear. No posterior oropharyngeal erythema. Eyes:      Conjunctiva/sclera: Conjunctivae normal.      Pupils: Pupils are equal, round, and reactive to light. Cardiovascular:      Rate and Rhythm: Normal rate and regular rhythm. Pulses: Normal pulses. Heart sounds: Normal heart sounds. No murmur heard. Pulmonary:      Effort: Pulmonary effort is normal.      Breath sounds: Normal breath sounds. No wheezing. Abdominal:      General: Abdomen is flat. Palpations: Abdomen is soft. Tenderness: There is no abdominal tenderness. Musculoskeletal:      Cervical back: Normal range of motion and neck supple. Skin:     General: Skin is warm and dry. Capillary Refill: Capillary refill takes less than 2 seconds. Neurological:      General: No focal deficit present. Mental Status: She is alert and oriented to person, place, and time. Mental status is at baseline.    Psychiatric:         Mood and Affect: Mood normal.         Behavior: Behavior normal.                 Data:     Lab Results   Component Value Date     07/06/2015    K 4.0 07/06/2015     07/06/2015    CO2 28 07/06/2015    BUN 12 07/06/2015    CREATININE 0.67 07/06/2015    GLUCOSE 93 07/15/2021    GLUCOSE 96 09/07/2011    AST 28 09/18/2013    ALT 36 09/18/2013     Lab Results   Component Value Date    WBC 5.9 07/06/2015    RBC 4.33 07/06/2015    HGB 12.3 07/06/2015    HCT 37.8 07/06/2015    MCV 87.4 07/06/2015    MCH 28.5 07/06/2015    MCHC 32.6 07/06/2015    RDW 13.8 07/06/2015     07/06/2015    MPV NOT REPORTED 07/06/2015     No results found for: TSH  Lab Results   Component Value Date    CHOL 172 07/15/2021    HDL 63 07/15/2021 LABA1C 5.8 09/18/2013          Assessment & Plan        Diagnosis Orders   1. Pruritic erythematous rash     2. Vaccine reaction, subsequent encounter     3. Seasonal allergic rhinitis due to pollen     4. Encounter for medical examination to establish care     5. History of COVID-19     6. Screening for colon cancer  Cologuard (For External Results Only)   7. Encounter for screening mammogram for malignant neoplasm of breast     8. Screening for cervical cancer       1. Differential diagnosis include idiopathic urticaria, contact dermatitis, localized lymphocytic response. Timing does not raise concern for a true vaccine reaction. Her symptoms are not consistent with true anaphylaxis, and I think that it would be likely safe for her to receive her second dose of her Pfizer vaccine, however I do feel it is reasonable to lonnie her a temporary vaccine exemption while I research this further. I would like to conduct a literature review for currently known COVID-19 vaccine adverse reactions, as well as discuss the matter with at least one other physician. If literature review and subsequent investigation reveals no concern for a potential life-threatening adverse reaction, I would feel confident recommending that she receive her second Pfizer dose. 3. Would recommend continued treatment with Claritin as needed, Flonase. 6. Will order Cologuard, as she is at average risk. 7. Would recommend she get a screening mammogram. She requests to schedule this herself. 8. I would recommend that get a PAP smear for repeat screening for cervical CA. She may have this done at either her previous GYN's office or my office. Follow up in one year for next year's AWV          Completed Refills   Requested Prescriptions      No prescriptions requested or ordered in this encounter     No follow-ups on file. No orders of the defined types were placed in this encounter.     Orders Placed This Encounter   Procedures    [employer] (For External Results Only)     This test is performed by an external laboratory and is used for result attachment only. It is required that this order requisition be faxed to: [facility] @ [phone]. See [email or web address] for further information. Standing Status:   Future     Standing Expiration Date:   12/3/2022         Patient Instructions     SURVEY:    You may be receiving a survey from [employer] regarding your visit today. Please complete the survey to enable us to provide the highest quality of care to you and your family. If you cannot score us a very good on any question, please call the office to discuss how we could of made your experience a very good one. Thank you. Clinical Care Team:     Dr. Maynor Mcbride, [facility]      ClericalTeam:     Jennifer Clayton      Electronically signed by Irwin Morris DO on 12/3/2021 at 11:25 AM           Completed Refills   Requested Prescriptions      No prescriptions requested or ordered in this encounter         Ame Frances received counseling on the following healthy behaviors: nutrition, exercise and medication adherence  Reviewed prior labs and health maintenance. Continue current medications, diet and exercise. Discussed use, benefit, and side effects of prescribed medications. Barriers to medication compliance addressed. Patient given educational materials - see patient instructions. All patient questions answered. Patient voiced understanding.

## 2021-12-03 NOTE — PATIENT INSTRUCTIONS
SURVEY:    You may be receiving a survey from [employer] regarding your visit today. Please complete the survey to enable us to provide the highest quality of care to you and your family. If you cannot score us a very good on any question, please call the office to discuss how we could of made your experience a very good one. Thank you.       Clinical Care Team:     Dr. Benita Sadler, [place]      ClericalTeam:     [id number] [facility]

## 2022-01-17 DIAGNOSIS — Z12.11 SCREENING FOR COLON CANCER: ICD-10-CM

## 2022-02-11 ENCOUNTER — OFFICE VISIT (OUTPATIENT)
Dept: FAMILY MEDICINE CLINIC | Age: 57
End: 2022-02-11
Payer: COMMERCIAL

## 2022-02-11 VITALS
RESPIRATION RATE: 20 BRPM | HEART RATE: 82 BPM | HEIGHT: 66 IN | DIASTOLIC BLOOD PRESSURE: 78 MMHG | BODY MASS INDEX: 27.58 KG/M2 | WEIGHT: 171.6 LBS | OXYGEN SATURATION: 97 % | SYSTOLIC BLOOD PRESSURE: 120 MMHG

## 2022-02-11 DIAGNOSIS — L29.8 PRURITIC ERYTHEMATOUS RASH: Primary | ICD-10-CM

## 2022-02-11 DIAGNOSIS — L30.1 DYSHIDROTIC ECZEMA: ICD-10-CM

## 2022-02-11 DIAGNOSIS — R94.2 ABNORMAL PFT: ICD-10-CM

## 2022-02-11 DIAGNOSIS — R06.09 EXERTIONAL DYSPNEA: ICD-10-CM

## 2022-02-11 PROBLEM — L29.89 PRURITIC ERYTHEMATOUS RASH: Status: ACTIVE | Noted: 2022-02-11

## 2022-02-11 PROCEDURE — 99214 OFFICE O/P EST MOD 30 MIN: CPT | Performed by: STUDENT IN AN ORGANIZED HEALTH CARE EDUCATION/TRAINING PROGRAM

## 2022-02-11 RX ORDER — BETAMETHASONE DIPROPIONATE 0.05 %
OINTMENT (GRAM) TOPICAL
Qty: 45 G | Refills: 1 | Status: SHIPPED | OUTPATIENT
Start: 2022-02-11

## 2022-02-11 RX ORDER — TRIAMCINOLONE ACETONIDE 0.25 MG/G
CREAM TOPICAL
Qty: 15 G | Refills: 1 | Status: SHIPPED | OUTPATIENT
Start: 2022-02-11

## 2022-02-11 ASSESSMENT — ENCOUNTER SYMPTOMS
SINUS PAIN: 0
NAUSEA: 0
ABDOMINAL PAIN: 0
COUGH: 0
RHINORRHEA: 0
BACK PAIN: 0
WHEEZING: 0
VOMITING: 0
DIARRHEA: 0

## 2022-02-11 NOTE — PROGRESS NOTES
HPI Notes    Name: Chad Schmitz  : 1965         Chief Complaint:     Chief Complaint   Patient presents with    Rash     Since last OV states she is still having flare ups on her hands and feet being red , Sometime it itches and burns ,    Cough     Still has a cough brought her Spirometry numbers and has concerns with DLCOunce being 69% done on 2022       History of Present Illness:        HPI    This is a 79-year-old woman presenting for evaluation of an erythematous, pruritic rash on the hands and feet. This problem has been occurring since receiving her second COVID-19 vaccine. This is a come-and-go problem, which occasionally flares up. She has tried \"everything under the moon\" to help this including putting a mud mask on the feet two weeks ago to poor effect. The area is very itchy and will occasionally burn. She also has an affected patch on the forehead. She also reports persistent ELIAS, with persistently low DLCO on assessing herself via PFT. She is inquiring if pulm referral would be beneficial.    Of note, she is reporting all of these since receiving her COVID-19 vaccines.      Past Medical History:     Past Medical History:   Diagnosis Date    Allergic rhinitis     Diverticulitis     PVC (premature ventricular contraction)     PVC (premature ventricular contraction)     Stress incontinence 2014    Urinary incontinence     Yeast vaginitis 2014      Reviewed all health maintenance requirements and ordered appropriate tests  Health Maintenance Due   Topic Date Due    Hepatitis C screen  Never done    HIV screen  Never done    DTaP/Tdap/Td vaccine (1 - Tdap) Never done    Cervical cancer screen  Never done    Shingles Vaccine (1 of 2) Never done    Breast cancer screen  2020    Flu vaccine (1) 2021    COVID-19 Vaccine (2 - Pfizer 3-dose series) 2021       Past Surgical History:     Past Surgical History:   Procedure Laterality Date    BREAST SURGERY      LUMP REMOVED FROM RIGHT BREAST    COLONOSCOPY  2011    DILATION AND CURETTAGE OF UTERUS      ENDOMETRIAL ABLATION      INCONTINENCE SURGERY      INTRACAPSULAR CATARACT EXTRACTION Right 6/10/2019    EYE CATARACT EMULSIFICATION IOL IMPLANT performed by Duglas Ramirez MD at [address] [facility]          Medications:       Prior to Admission medications    Medication Sig Start Date End Date Taking? Authorizing Provider   Probiotic Product (PROBIOTIC DAILY PO)  4/1/20  Yes Historical Provider, MD   magnesium gluconate (MAGONATE) 500 MG tablet Take 500 mg by mouth daily   Yes Historical Provider, MD   fluticasone (FLONASE) 50 MCG/ACT nasal spray 2 sprays by Nasal route daily 9/15/15  Yes Jaspalcharley Duffy MD   Calcium Citrate-Vitamin D (CITRACAL MAXIMUM PO) Take 1 tablet by mouth daily   Yes Historical Provider, MD   Omega-3 Fatty Acids (FISH OIL) 1200 MG CAPS Take 1 tablet by mouth daily  Patient not taking: Reported on 11/19/2021    Historical Provider, MD        Allergies:       Fish-derived products, Epinephrine, and Racemic amphetamine [amphetamine]    Social History:     Tobacco:    reports that she has never smoked. She has never used smokeless tobacco.  Alcohol:      reports current alcohol use. Drug Use:  reports no history of drug use. Family History:     Family History   Problem Relation Age of Onset   [facility] Cancer Mother     Diabetes Mother     Heart Disease Father        Review of Systems:         Review of Systems   Constitutional: Negative for fever. HENT: Negative for rhinorrhea, sinus pain and sneezing. Respiratory: Negative for cough and wheezing. Cardiovascular: Negative for chest pain. Gastrointestinal: Negative for abdominal pain, diarrhea, nausea and vomiting. Musculoskeletal: Negative for back pain. Skin: Positive for rash. Neurological: Negative for headaches. Psychiatric/Behavioral: Negative for sleep disturbance. Physical Exam:     Vitals:  /78 (Site: Left Upper Arm, Position: Sitting, Cuff Size: Medium Adult)   Pulse 82   Resp 20   Ht 5' 6\" (1.676 m)   Wt 171 lb 9.6 oz (77.8 kg)   SpO2 97%   BMI 27.70 kg/m²       Physical Exam  Vitals and nursing note reviewed. Constitutional:       General: She is not in acute distress. Appearance: Normal appearance. Skin:     General: Skin is warm and dry. Capillary Refill: Capillary refill takes less than 2 seconds. Findings: Rash present. Neurological:      General: No focal deficit present. Mental Status: She is alert and oriented to person, place, and time. Mental status is at baseline. Cranial Nerves: No cranial nerve deficit. Psychiatric:         Mood and Affect: Mood normal.         Behavior: Behavior normal.         Thought Content: Thought content normal.                         Data:     Lab Results   Component Value Date     07/06/2015    K 4.0 07/06/2015     07/06/2015    CO2 28 07/06/2015    BUN 12 07/06/2015    CREATININE 0.67 07/06/2015    GLUCOSE 93 07/15/2021    GLUCOSE 96 09/07/2011    AST 28 09/18/2013    ALT 36 09/18/2013     Lab Results   Component Value Date    WBC 5.9 07/06/2015    RBC 4.33 07/06/2015    HGB 12.3 07/06/2015    HCT 37.8 07/06/2015    MCV 87.4 07/06/2015    MCH 28.5 07/06/2015    MCHC 32.6 07/06/2015    RDW 13.8 07/06/2015     07/06/2015    MPV NOT REPORTED 07/06/2015     No results found for: TSH  Lab Results   Component Value Date    CHOL 172 07/15/2021    HDL 63 07/15/2021    LABA1C 5.8 09/18/2013          Assessment & Plan        Diagnosis Orders   1. Pruritic erythematous rash     2. Dyshidrotic eczema     3. Exertional dyspnea     4. Abnormal PFT       1. The clinical appearence and distribution of this rash is more consistent with dishidrotic eczema. I would recommend treatment with a high potency steroid ointment BID for 2 weeks.   The patient I had a long discussion about starting betamethasone. We discussed its mechanism of action, intended goals, adverse effects, as well as common side effects. They were able to verbalize understanding, and repeat plan back to me. I would like to refer her to dermatology for a punch biopsy to determine basal cause; she requested time to check which dermatologist would be covered under her insurance. Will place referral on her request.    3. Overall, PFT WNL, but DLCO persistently low and she has persistent ELIAS. 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 If she needs a referral, I will be happy to place this. Follow up in 2 weeks if not improved          Completed Refills   Requested Prescriptions     Pending Prescriptions Disp Refills    betamethasone dipropionate (DIPROLENE) 0.05 % ointment 45 g 1     Sig: Apply topically 2 times daily. No follow-ups on file. No orders of the defined types were placed in this encounter. No orders of the defined types were placed in this encounter. Patient Instructions     SURVEY:    You may be receiving a survey from [employer] regarding your visit today. Please complete the survey to enable us to provide the highest quality of care to you and your family. If you cannot score us a very good on any question, please call the office to discuss how we could of made your experience a very good one. Thank you. Clinical Care Team:     Dr. Kang Ovalles, [place]      ClericalTeam:     Ramiro Montelongo      Electronically signed by Nita Hernandez DO on 2/11/2022 at 10:24 AM           Completed Refills   Requested Prescriptions     Pending Prescriptions Disp Refills    betamethasone dipropionate (DIPROLENE) 0.05 % ointment 45 g 1     Sig: Apply topically 2 times daily.          Francesca Layne received counseling on the following healthy behaviors: nutrition, exercise and medication adherence  Reviewed prior labs and health maintenance. Continue current medications, diet and exercise. Discussed use, benefit, and side effects of prescribed medications. Barriers to medication compliance addressed. Patient given educational materials - see patient instructions. All patient questions answered. Patient voiced understanding.

## 2022-03-15 ENCOUNTER — TELEPHONE (OUTPATIENT)
Dept: FAMILY MEDICINE CLINIC | Age: 57
End: 2022-03-15

## 2022-04-09 NOTE — PATIENT INSTRUCTIONS
nose, and eyes. What can you do to avoid spreading the virus to others? To help avoid spreading the virus to others:  · Cover your mouth with a tissue when you cough or sneeze. Then throw the tissue in the trash. · Use a disinfectant to clean things that you touch often. · Stay home if you are sick or have been exposed to the virus. Don't go to school, work, or public areas. And don't use public transportation. · If you are sick:  ? Leave your home only if you need to get medical care. But call the doctor's office first so they know you're coming. And wear a face mask, if you have one.  ? If you have a face mask, wear it whenever you're around other people. It can help stop the spread of the virus when you cough or sneeze. ? Clean and disinfect your home every day. Use household  and disinfectant wipes or sprays. Take special care to clean things that you grab with your hands. These include doorknobs, remote controls, phones, and handles on your refrigerator and microwave. And don't forget countertops, tabletops, bathrooms, and computer keyboards. When to call for help  Call 911 anytime you think you may need emergency care. For example, call if:  · You have severe trouble breathing. (You can't talk at all.)  · You have constant chest pain or pressure. · You are severely dizzy or lightheaded. · You are confused or can't think clearly. · Your face and lips have a blue color. · You pass out (lose consciousness) or are very hard to wake up. Call your doctor now if you develop symptoms such as:  · Shortness of breath. · Fever. · Cough. If you need to get care, call ahead to the doctor's office for instructions before you go. Make sure you wear a face mask, if you have one, to prevent exposing other people to the virus. Where can you get the latest information? The following health organizations are tracking and studying this virus. Their websites contain the most up-to-date information.  Cherri Farr Statement Selected and those who are ill or debilitated. Tell your doctor if you have ever had:  · pneumonia;  · liver or kidney disease;  · myasthenia gravis;  · low levels of potassium in your blood;  · a heart rhythm disorder; or  · long QT syndrome (in you or a family member). It is not known  whether this medicine is effective in treating genital ulcers in women. Tell your doctor if you are pregnant or breastfeeding. Taking azithromycin while breastfeeding may cause diarrhea, vomiting, or rash in the nursing baby. Azithromycin is not approved for use by anyone younger than 7 months old. Azithromycin should not be used to treat a throat or tonsil infection in a child younger than 3years old. How should I take azithromycin? Follow all directions on your prescription label and read all medication guides or instruction sheets. Use the medicine exactly as directed. Azithromycin oral  is taken by mouth. Azithromycin injection is given as an infusion into a vein, usually for 2 days before you switch to azithromycin oral. A healthcare provider will give you this injection. You may take azithromycin oral with or without food. Shake the oral suspension (liquid) before you measure a dose. Use the dosing syringe provided, or use a medicine dose-measuring device (not a kitchen spoon). Use this medicine for the full prescribed length of time, even if your symptoms quickly improve. Skipping doses can increase your risk of infection that is resistant to medication. Azithromycin will not treat a viral infection such as the flu or a common cold. Store at room temperature away from moisture and heat. Throw away any unused liquid medicine after 10 days. What happens if I miss a dose? Take the medicine as soon as you can, but skip the missed dose if it is almost time for your next dose. Do not take two doses at one time. What happens if I overdose? Seek emergency medical attention or call the Poison Help line at 1-148.243.6546.   What should I avoid while taking azithromycin? Antibiotic medicines can cause diarrhea, which may be a sign of a new infection. If you have diarrhea that is watery or bloody, call your doctor before using anti-diarrhea medicine. Azithromycin could make you sunburn more easily. Avoid sunlight or tanning beds. Wear protective clothing and use sunscreen (SPF 30 or higher) when you are outdoors. What are the possible side effects of azithromycin? Get emergency medical help if you have signs of an allergic reaction (hives, difficult breathing, swelling in your face or throat) or a severe skin reaction (fever, sore throat, burning in your eyes, skin pain, red or purple skin rash that spreads and causes blistering and peeling). Seek medical treatment if you have a serious drug reaction that can affect many parts of your body. Symptoms may include: skin rash, fever, swollen glands, muscle aches, severe weakness, unusual bruising, or yellowing of your skin or eyes. Call your doctor at once if you have:  · severe stomach pain, diarrhea that is watery or bloody;  · fast or pounding heartbeats, fluttering in your chest, shortness of breath, and sudden dizziness (like you might pass out); or  · liver problems --nausea, vomiting, loss of appetite, stomach pain (upper right side), tiredness, itching, dark urine, humberto-colored stools, jaundice (yellowing of the skin or eyes); Call your doctor right away if a baby taking azithromycin becomes irritable or vomits while eating or nursing. Older adults may be more likely to have side effects on heart rhythm, including a life-threatening fast heart rate. Common side effects may include:  · nausea, vomiting; or  · stomach pain. This is not a complete list of side effects and others may occur. Call your doctor for medical advice about side effects. You may report side effects to FDA at 2-752-FDA-6954. What other drugs will affect azithromycin?   Tell your doctor about all your other medicines, especially:  · colchicine;  · digoxin;  · nelfinavir;  · phenytoin;  · an antacid that contains aluminum or magnesium --Acid Gone, Gaviscon, Gelusil, Maalox, Milk of Magnesia, Mylanta, Pepcid Complete, Rolaids, Rulox, and others; or  · a blood thinner --warfarin, Coumadin, Jantoven. This list is not complete. Other drugs may affect azithromycin, including prescription and over-the-counter medicines, vitamins, and herbal products. Not all possible drug interactions are listed here. Where can I get more information? Your pharmacist can provide more information about azithromycin. Remember, keep this and all other medicines out of the reach of children, never share your medicines with others, and use this medication only for the indication prescribed. Every effort has been made to ensure that the information provided by [address] is accurate, up-to-date, and complete, but no guarantee is made to that effect. Drug information contained herein may be time sensitive. [facility] information has been compiled for use by healthcare practitioners and consumers in the [place] and therefore [facility] does not warrant that uses outside of the [place] are appropriate, unless specifically indicated otherwise. [facility]'s drug information does not endorse drugs, diagnose patients or recommend therapy. [facility]'s drug information is an informational resource designed to assist licensed healthcare practitioners in caring for their patients and/or to serve consumers viewing this service as a supplement to, and not a substitute for, the expertise, skill, knowledge and judgment of healthcare practitioners. The absence of a warning for a given drug or drug combination in no way should be construed to indicate that the drug or drug combination is safe, effective or appropriate for any given patient.  [facility] does not assume any responsibility for any aspect of healthcare administered with the aid of

## 2022-05-20 ENCOUNTER — TELEPHONE (OUTPATIENT)
Dept: FAMILY MEDICINE CLINIC | Age: 57
End: 2022-05-20

## 2023-03-16 ENCOUNTER — HOSPITAL ENCOUNTER (OUTPATIENT)
Age: 58
Discharge: HOME | End: 2023-03-16
Payer: COMMERCIAL

## 2023-03-16 ENCOUNTER — HOSPITAL ENCOUNTER (OUTPATIENT)
Dept: GENERAL RADIOLOGY | Age: 58
Discharge: HOME | End: 2023-03-18
Payer: COMMERCIAL

## 2023-03-16 ENCOUNTER — OFFICE VISIT (OUTPATIENT)
Dept: FAMILY MEDICINE CLINIC | Age: 58
End: 2023-03-16
Payer: COMMERCIAL

## 2023-03-16 ENCOUNTER — HOSPITAL ENCOUNTER (OUTPATIENT)
Age: 58
Discharge: HOME | End: 2023-03-18
Payer: COMMERCIAL

## 2023-03-16 VITALS
SYSTOLIC BLOOD PRESSURE: 132 MMHG | WEIGHT: 172 LBS | DIASTOLIC BLOOD PRESSURE: 88 MMHG | TEMPERATURE: 98 F | HEIGHT: 66 IN | HEART RATE: 70 BPM | OXYGEN SATURATION: 97 % | BODY MASS INDEX: 27.64 KG/M2

## 2023-03-16 DIAGNOSIS — R05.9 COUGH IN ADULT: ICD-10-CM

## 2023-03-16 DIAGNOSIS — J18.9 COMMUNITY ACQUIRED PNEUMONIA, UNSPECIFIED LATERALITY: ICD-10-CM

## 2023-03-16 DIAGNOSIS — R05.9 COUGH IN ADULT: Primary | ICD-10-CM

## 2023-03-16 LAB
ABSOLUTE EOS #: 0.2 K/UL (ref 0–0.4)
ABSOLUTE LYMPH #: 1.7 K/UL (ref 1–4.8)
ABSOLUTE MONO #: 0.4 K/UL (ref 0–1)
ANION GAP SERPL CALCULATED.3IONS-SCNC: 12 MMOL/L (ref 9–17)
BASOPHILS # BLD: 0 % (ref 0–2)
BASOPHILS ABSOLUTE: 0 K/UL (ref 0–0.2)
BUN SERPL-MCNC: 10 MG/DL (ref 6–20)
BUN/CREAT BLD: 17 (ref 9–20)
CALCIUM SERPL-MCNC: 8.9 MG/DL (ref 8.6–10.4)
CHLORIDE SERPL-SCNC: 106 MMOL/L (ref 98–107)
CO2 SERPL-SCNC: 22 MMOL/L (ref 20–31)
CREAT SERPL-MCNC: 0.59 MG/DL (ref 0.5–0.9)
DIFFERENTIAL TYPE: YES
EOSINOPHILS RELATIVE PERCENT: 3 % (ref 0–5)
GFR SERPL CREATININE-BSD FRML MDRD: >60 ML/MIN/1.73M2
GLUCOSE SERPL-MCNC: 101 MG/DL (ref 70–99)
HCT VFR BLD AUTO: 39.2 % (ref 36–46)
HGB BLD-MCNC: 13 G/DL (ref 12–16)
INFLUENZA A ANTIGEN, POC: NEGATIVE
INFLUENZA B ANTIGEN, POC: NEGATIVE
LOT NUMBER POC: NORMAL
LYMPHOCYTES # BLD: 34 % (ref 15–40)
MCH RBC QN AUTO: 27.9 PG (ref 26–34)
MCHC RBC AUTO-ENTMCNC: 33.1 G/DL (ref 31–37)
MCV RBC AUTO: 84.3 FL (ref 80–100)
MONOCYTES # BLD: 8 % (ref 4–8)
PDW BLD-RTO: 14.1 % (ref 12.1–15.2)
PLATELET # BLD AUTO: 247 K/UL (ref 140–450)
POTASSIUM SERPL-SCNC: 3.6 MMOL/L (ref 3.7–5.3)
RBC # BLD: 4.65 M/UL (ref 4–5.2)
SARS-COV-2 RNA POC - COV: NORMAL
SEG NEUTROPHILS: 55 % (ref 47–75)
SEGMENTED NEUTROPHILS ABSOLUTE COUNT: 2.8 K/UL (ref 2.5–7)
SODIUM SERPL-SCNC: 140 MMOL/L (ref 135–144)
VALID INTERNAL CONTROL, POC: PRESENT
VENDOR AND KIT NAME POC: NORMAL
WBC # BLD AUTO: 5.1 K/UL (ref 3.5–11)

## 2023-03-16 PROCEDURE — 99214 OFFICE O/P EST MOD 30 MIN: CPT | Performed by: STUDENT IN AN ORGANIZED HEALTH CARE EDUCATION/TRAINING PROGRAM

## 2023-03-16 PROCEDURE — 71046 X-RAY EXAM CHEST 2 VIEWS: CPT

## 2023-03-16 PROCEDURE — 85025 COMPLETE CBC W/AUTO DIFF WBC: CPT

## 2023-03-16 PROCEDURE — 80048 BASIC METABOLIC PNL TOTAL CA: CPT

## 2023-03-16 PROCEDURE — 36415 COLL VENOUS BLD VENIPUNCTURE: CPT

## 2023-03-16 RX ORDER — ALBUTEROL SULFATE 90 UG/1
2 AEROSOL, METERED RESPIRATORY (INHALATION) 4 TIMES DAILY PRN
Qty: 18 G | Refills: 5 | Status: SHIPPED | OUTPATIENT
Start: 2023-03-16 | End: 2023-03-16

## 2023-03-16 RX ORDER — AZITHROMYCIN 250 MG/1
250 TABLET, FILM COATED ORAL SEE ADMIN INSTRUCTIONS
Qty: 6 TABLET | Refills: 0 | Status: SHIPPED | OUTPATIENT
Start: 2023-03-16 | End: 2023-03-21

## 2023-03-16 RX ORDER — ALBUTEROL SULFATE 90 UG/1
2 AEROSOL, METERED RESPIRATORY (INHALATION) 4 TIMES DAILY PRN
Qty: 18 G | Refills: 5 | Status: SHIPPED | OUTPATIENT
Start: 2023-03-16

## 2023-03-16 RX ORDER — AZITHROMYCIN 250 MG/1
250 TABLET, FILM COATED ORAL SEE ADMIN INSTRUCTIONS
Qty: 6 TABLET | Refills: 0 | Status: SHIPPED | OUTPATIENT
Start: 2023-03-16 | End: 2023-03-16

## 2023-03-16 SDOH — ECONOMIC STABILITY: FOOD INSECURITY: WITHIN THE PAST 12 MONTHS, YOU WORRIED THAT YOUR FOOD WOULD RUN OUT BEFORE YOU GOT MONEY TO BUY MORE.: NEVER TRUE

## 2023-03-16 SDOH — ECONOMIC STABILITY: INCOME INSECURITY: HOW HARD IS IT FOR YOU TO PAY FOR THE VERY BASICS LIKE FOOD, HOUSING, MEDICAL CARE, AND HEATING?: NOT HARD AT ALL

## 2023-03-16 SDOH — ECONOMIC STABILITY: HOUSING INSECURITY
IN THE LAST 12 MONTHS, WAS THERE A TIME WHEN YOU DID NOT HAVE A STEADY PLACE TO SLEEP OR SLEPT IN A SHELTER (INCLUDING NOW)?: NO

## 2023-03-16 SDOH — ECONOMIC STABILITY: FOOD INSECURITY: WITHIN THE PAST 12 MONTHS, THE FOOD YOU BOUGHT JUST DIDN'T LAST AND YOU DIDN'T HAVE MONEY TO GET MORE.: NEVER TRUE

## 2023-03-16 ASSESSMENT — ENCOUNTER SYMPTOMS
COUGH: 1
VOMITING: 0
DIARRHEA: 0
ABDOMINAL PAIN: 0
RHINORRHEA: 0
SHORTNESS OF BREATH: 1
BACK PAIN: 0
SINUS PAIN: 0
WHEEZING: 0
NAUSEA: 0

## 2023-03-16 ASSESSMENT — PATIENT HEALTH QUESTIONNAIRE - PHQ9
SUM OF ALL RESPONSES TO PHQ QUESTIONS 1-9: 0
SUM OF ALL RESPONSES TO PHQ9 QUESTIONS 1 & 2: 0
SUM OF ALL RESPONSES TO PHQ QUESTIONS 1-9: 0
SUM OF ALL RESPONSES TO PHQ QUESTIONS 1-9: 0
2. FEELING DOWN, DEPRESSED OR HOPELESS: 0
1. LITTLE INTEREST OR PLEASURE IN DOING THINGS: 0
SUM OF ALL RESPONSES TO PHQ QUESTIONS 1-9: 0

## 2023-03-16 NOTE — PROGRESS NOTES
HPI Notes    Name: Ntia Suresh  : 1965         Chief Complaint:     Chief Complaint   Patient presents with    URI     Cough, congestion and sob. Symptoms started last Monday       History of Present Illness:        HPI    This is a 59-year-old woman presenting for evaluation of a recent symptoms of upper respiratory infection occluding cough, nasal congestion, shortness of air. Symptoms began 10 days ago. She is feeling SOA even going up a few stairs carrying her bags. Her cough is productive but it is difficult to cough without gagging. She has remained afebrile this entire time. 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 She has been taking Mucinex severe, Dayquil, which have been mildly helpful. She even used an albuterol inhaler which was very helpful as well.      Past Medical History:     Past Medical History:   Diagnosis Date    Allergic rhinitis     Diverticulitis     PVC (premature ventricular contraction)     PVC (premature ventricular contraction)     Stress incontinence 2014    Urinary incontinence     Yeast vaginitis 2014      Reviewed all health maintenance requirements and ordered appropriate tests  Health Maintenance Due   Topic Date Due    HIV screen  Never done    Hepatitis C screen  Never done    DTaP/Tdap/Td vaccine (1 - Tdap) Never done    Cervical cancer screen  Never done    Shingles vaccine (1 of 2) Never done    A1C test (Diabetic or Prediabetic)  2014    Breast cancer screen  2020    COVID-19 Vaccine (2 - Pfizer series) 2021    Depression Screen  2022       Past Surgical History:     Past Surgical History:   Procedure Laterality Date    BREAST SURGERY      LUMP REMOVED FROM RIGHT BREAST    COLONOSCOPY      DILATION AND CURETTAGE OF UTERUS      ENDOMETRIAL ABLATION      INCONTINENCE SURGERY      INTRACAPSULAR CATARACT EXTRACTION Right 6/10/2019    EYE CATARACT EMULSIFICATION IOL IMPLANT performed by Sharonda Nolasco MD at [facility] OR    TONSILLECTOMY  1971    TUBAL LIGATION          Medications:       Prior to Admission medications    Medication Sig Start Date End Date Taking? Authorizing Provider   azithromycin (ZITHROMAX) 250 MG tablet Take 1 tablet by mouth See Admin Instructions for 5 days 500mg on day 1 followed by 250mg on days 2 - 5 3/16/23 3/21/23 Yes Georgia Reddy DO   albuterol sulfate HFA (VENTOLIN HFA) 108 (90 Base) MCG/ACT inhaler Inhale 2 puffs into the lungs 4 times daily as needed for Wheezing 3/16/23  Yes Be Smith,    Probiotic Product (PROBIOTIC DAILY PO)  4/1/20  Yes Historical Provider, MD   Omega-3 Fatty Acids (FISH OIL) 1200 MG CAPS Take 1 tablet by mouth daily   Yes Historical Provider, MD   fluticasone (FLONASE) 50 MCG/ACT nasal spray 2 sprays by Nasal route daily 9/15/15  Yes Tayler Pereira MD   betamethasone dipropionate (DIPROLENE) 0.05 % ointment Apply topically 2 times daily. 2/11/22   Georgia Reddy DO   triamcinolone (KENALOG) 0.025 % cream Apply topically 2 times daily for 2 weeks to forehead 2/11/22   Georgia Reddy DO   magnesium gluconate (MAGONATE) 500 MG tablet Take 500 mg by mouth daily    Historical Provider, MD   Calcium Citrate-Vitamin D (CITRACAL MAXIMUM PO) Take 1 tablet by mouth daily    Historical Provider, MD        Allergies:       Fish-derived products, Epinephrine, and Racemic amphetamine [amphetamine]    Social History:     Tobacco:    reports that she has never smoked. She has never used smokeless tobacco.  Alcohol:      reports current alcohol use. Drug Use:  reports no history of drug use. Family History:     Family History   Problem Relation Age of Onset    Cancer Mother     Diabetes Mother     Heart Disease Father        Review of Systems:         Review of Systems   Constitutional:  Negative for fever. HENT:  Positive for congestion. Negative for rhinorrhea, sinus pain and sneezing. Respiratory:  Positive for cough and shortness of breath. Negative for wheezing. Cardiovascular:  Negative for chest pain. Gastrointestinal:  Negative for abdominal pain, diarrhea, nausea and vomiting. Genitourinary:  Negative for menstrual problem and vaginal discharge. Musculoskeletal:  Negative for back pain. Skin:  Negative for rash. Neurological:  Negative for headaches. Psychiatric/Behavioral:  Negative for sleep disturbance. Physical Exam:     Vitals:  /88   Pulse 70   Temp 98 °F (36.7 °C)   Ht 5' 6\" (1.676 m)   Wt 172 lb (78 kg)   SpO2 97%   BMI 27.76 kg/m²       Physical Exam  Vitals and nursing note reviewed. Constitutional:       General: She is not in acute distress. Appearance: Normal appearance. She is normal weight. HENT:      Right Ear: Tympanic membrane, ear canal and external ear normal. There is no impacted cerumen. Left Ear: Tympanic membrane, ear canal and external ear normal. There is no impacted cerumen. Nose: Nose normal. No congestion or rhinorrhea. Mouth/Throat:      Mouth: Mucous membranes are moist.      Pharynx: Oropharynx is clear. No oropharyngeal exudate or posterior oropharyngeal erythema. Eyes:      Conjunctiva/sclera: Conjunctivae normal.   Cardiovascular:      Rate and Rhythm: Normal rate and regular rhythm. Pulses: Normal pulses. Heart sounds: Normal heart sounds. No murmur heard. Pulmonary:      Effort: Pulmonary effort is normal. No respiratory distress. Breath sounds: Wheezing and rhonchi present. Comments: Bibasilar crackles  Skin:     General: Skin is warm and dry. Capillary Refill: Capillary refill takes less than 2 seconds. Neurological:      General: No focal deficit present. Mental Status: She is alert and oriented to person, place, and time. Mental status is at baseline. Cranial Nerves: No cranial nerve deficit.    Psychiatric:         Mood and Affect: Mood normal.         Behavior: Behavior normal.         Thought Content: Thought content normal.       Data:     Lab Results   Component Value Date/Time     07/06/2015 10:56 AM    K 4.0 07/06/2015 10:56 AM     07/06/2015 10:56 AM    CO2 28 07/06/2015 10:56 AM    BUN 12 07/06/2015 10:56 AM    CREATININE 0.67 07/06/2015 10:56 AM    GLUCOSE 93 07/15/2021 06:58 AM    GLUCOSE 96 09/07/2011 05:17 AM    AST 28 09/18/2013 08:27 AM    ALT 36 09/18/2013 08:27 AM     Lab Results   Component Value Date/Time    WBC 5.9 07/06/2015 10:56 AM    RBC 4.33 07/06/2015 10:56 AM    HGB 12.3 07/06/2015 10:56 AM    HCT 37.8 07/06/2015 10:56 AM    MCV 87.4 07/06/2015 10:56 AM    MCH 28.5 07/06/2015 10:56 AM    MCHC 32.6 07/06/2015 10:56 AM    RDW 13.8 07/06/2015 10:56 AM     07/06/2015 10:56 AM    MPV NOT REPORTED 07/06/2015 10:56 AM     No results found for: TSH  Lab Results   Component Value Date/Time    CHOL 172 07/15/2021 06:58 AM    HDL 63 07/15/2021 06:58 AM    LABA1C 5.8 09/18/2013 08:27 AM          Assessment & Plan        Diagnosis Orders   1. Cough in adult  POC COVID-19, FLU A/B    XR CHEST STANDARD (2 VW)    azithromycin (ZITHROMAX) 250 MG tablet    albuterol sulfate HFA (VENTOLIN HFA) 108 (90 Base) MCG/ACT inhaler    CBC with Auto Differential    Basic Metabolic Panel      2. Community acquired pneumonia, unspecified laterality  XR CHEST STANDARD (2 VW)    azithromycin (ZITHROMAX) 250 MG tablet    albuterol sulfate HFA (VENTOLIN HFA) 108 (90 Base) MCG/ACT inhaler    CBC with Auto Differential    Basic Metabolic Panel          Negative for influenza, COVID-19 via point-of-care testing. Concern is for CAP vs bronchitis. Will Rx azithromycin, albuterol inhaler, continue OTC cough medication and obtain CXR, CBC, BMP today. The patient I had a long discussion about starting azithromycin. We discussed its mechanism of action, intended goals, adverse effects, as well as common side effects. They were able to verbalize understanding, and repeat plan back to me.       Follow up PRN          Completed Refills   Requested Prescriptions     Signed Prescriptions Disp Refills    azithromycin (ZITHROMAX) 250 MG tablet 6 tablet 0     Sig: Take 1 tablet by mouth See Admin Instructions for 5 days 500mg on day 1 followed by 250mg on days 2 - 5    albuterol sulfate HFA (VENTOLIN HFA) 108 (90 Base) MCG/ACT inhaler 18 g 5     Sig: Inhale 2 puffs into the lungs 4 times daily as needed for Wheezing     No follow-ups on file. Orders Placed This Encounter   Medications    azithromycin (ZITHROMAX) 250 MG tablet     Sig: Take 1 tablet by mouth See Admin Instructions for 5 days 500mg on day 1 followed by 250mg on days 2 - 5     Dispense:  6 tablet     Refill:  0    albuterol sulfate HFA (VENTOLIN HFA) 108 (90 Base) MCG/ACT inhaler     Sig: Inhale 2 puffs into the lungs 4 times daily as needed for Wheezing     Dispense:  18 g     Refill:  5     Orders Placed This Encounter   Procedures    XR CHEST STANDARD (2 VW)     Standing Status:   Future     Standing Expiration Date:   3/16/2024     Order Specific Question:   Reason for exam:     Answer:   concern for CAP; has bibasilar crackles and expiratory wheezes    CBC with Auto Differential     Standing Status:   Future     Standing Expiration Date:   5/18/0574    Basic Metabolic Panel     Standing Status:   Future     Standing Expiration Date:   3/16/2024    POC COVID-19, FLU A/B         There are no Patient Instructions on file for this visit.     Electronically signed by Morgan Dominguez DO on 3/16/2023 at 11:25 AM           Completed Refills   Requested Prescriptions     Signed Prescriptions Disp Refills    azithromycin (ZITHROMAX) 250 MG tablet 6 tablet 0     Sig: Take 1 tablet by mouth See Admin Instructions for 5 days 500mg on day 1 followed by 250mg on days 2 - 5    albuterol sulfate HFA (VENTOLIN HFA) 108 (90 Base) MCG/ACT inhaler 18 g 5     Sig: Inhale 2 puffs into the lungs 4 times daily as needed for Wheezing

## 2023-05-24 LAB
CHOLEST SERPL-MCNC: 175 MG/DL
CHOLESTEROL/HDL RATIO: 3.4
GLUCOSE SERPL-MCNC: 105 MG/DL (ref 70–99)
HDLC SERPL-MCNC: 51 MG/DL
LDLC SERPL CALC-MCNC: 105 MG/DL (ref 0–130)
TRIGL SERPL-MCNC: 97 MG/DL

## 2024-01-25 ENCOUNTER — HOSPITAL ENCOUNTER (EMERGENCY)
Age: 59
Discharge: HOME | End: 2024-01-25
Attending: EMERGENCY MEDICINE
Payer: COMMERCIAL

## 2024-01-25 VITALS
TEMPERATURE: 98.1 F | DIASTOLIC BLOOD PRESSURE: 84 MMHG | OXYGEN SATURATION: 97 % | SYSTOLIC BLOOD PRESSURE: 147 MMHG | HEART RATE: 66 BPM | RESPIRATION RATE: 16 BRPM

## 2024-01-25 DIAGNOSIS — H10.31 ACUTE CONJUNCTIVITIS OF RIGHT EYE, UNSPECIFIED ACUTE CONJUNCTIVITIS TYPE: Primary | ICD-10-CM

## 2024-01-25 PROCEDURE — 99283 EMERGENCY DEPT VISIT LOW MDM: CPT

## 2024-01-25 PROCEDURE — 6370000000 HC RX 637 (ALT 250 FOR IP): Performed by: EMERGENCY MEDICINE

## 2024-01-25 RX ORDER — TETRACAINE HYDROCHLORIDE 5 MG/ML
1 SOLUTION OPHTHALMIC ONCE
Status: COMPLETED | OUTPATIENT
Start: 2024-01-25 | End: 2024-01-25

## 2024-01-25 RX ORDER — PURIFIED WATER 986 MG/ML
1 SOLUTION OPHTHALMIC ONCE
Status: COMPLETED | OUTPATIENT
Start: 2024-01-25 | End: 2024-01-25

## 2024-01-25 RX ORDER — GENTAMICIN SULFATE 3 MG/ML
1 SOLUTION/ DROPS OPHTHALMIC ONCE
Status: COMPLETED | OUTPATIENT
Start: 2024-01-25 | End: 2024-01-25

## 2024-01-25 RX ADMIN — PURIFIED WATER 1 DROP: 986 SOLUTION OPHTHALMIC at 07:33

## 2024-01-25 RX ADMIN — FLUORESCEIN SODIUM 1 MG: 1 STRIP OPHTHALMIC at 07:30

## 2024-01-25 RX ADMIN — TETRACAINE HYDROCHLORIDE 1 DROP: 5 SOLUTION OPHTHALMIC at 07:30

## 2024-01-25 RX ADMIN — GENTAMICIN SULFATE 1 DROP: 3 SOLUTION OPHTHALMIC at 07:33

## 2024-01-25 NOTE — ED PROVIDER NOTES
with fluorescein stain and Woods lamp exam.  No photophobia.  No surrounding cellulitis.  No stye         DIAGNOSTIC RESULTS     EKG: All EKG's are interpreted by the Emergency Department Physician who either signs or Co-signs this chart in the absence of a cardiologist.        RADIOLOGY:   Non-plain film images such as CT, Ultrasound and MRI are read by the radiologist. Plain radiographic images are visualized and preliminarily interpreted by the emergency physician with the below findings:        Interpretation per the Radiologist below, if available at the time of this note:    No orders to display         ED BEDSIDE ULTRASOUND:   Performed by ED Physician - none    LABS:  Labs Reviewed - No data to display    All other labs were within normal range or not returned as of this dictation.    EMERGENCY DEPARTMENT COURSE and DIFFERENTIAL DIAGNOSIS/MDM:   Vitals:    Vitals:    01/25/24 0728   BP: (!) 147/84   Pulse: 66   Temp: 98.1 °F (36.7 °C)   TempSrc: Oral         MDM  Number of Diagnoses or Management Options  Acute conjunctivitis of right eye, unspecified acute conjunctivitis type  Diagnosis management comments: No foreign body, no corneal abrasion noted on exam.  Right eye conjunctivitis.  Home care instructions ED return and follow-up discussed.  Patient in agreement with        [facility]       REASSESSMENT          CRITICAL CARE TIME   Total Critical Care time was  minutes, excluding separately reportable procedures.  There was a high probability of clinically significant/life threatening deterioration in the patient's condition which required my urgent intervention.      CONSULTS:  None    PROCEDURES:  Unless otherwise noted below, none     Procedures        FINAL IMPRESSION      1. Acute conjunctivitis of right eye, unspecified acute conjunctivitis type          DISPOSITION/PLAN   DISPOSITION Decision To Discharge 01/25/2024 07:30:25 AM      PATIENT REFERRED TO:  Jason Henning,   [address] [facility]

## 2024-01-25 NOTE — DISCHARGE INSTRUCTIONS
Gentamicin ophthalmic drops 1 drop 4 times a day to right eye for 10 days.  May use over-the-counter lubricating eyedrops as needed and directed.  Do not use antired eye drops-just lubricating drops.  Follow-up with ophthalmology in 2 to 3 days if symptoms not significantly improved.  Seek medical attention immediately for any worsening symptoms or any other acute concerns

## 2024-05-22 LAB
CHOLEST SERPL-MCNC: 177 MG/DL (ref 0–199)
CHOLESTEROL/HDL RATIO: 3
GLUCOSE SERPL-MCNC: 97 MG/DL (ref 70–99)
HDLC SERPL-MCNC: 64 MG/DL
LDLC SERPL CALC-MCNC: 97 MG/DL (ref 0–100)
TRIGL SERPL-MCNC: 82 MG/DL
VLDLC SERPL CALC-MCNC: 16 MG/DL

## 2024-12-30 ENCOUNTER — OFFICE VISIT (OUTPATIENT)
Dept: FAMILY MEDICINE CLINIC | Age: 59
End: 2024-12-30
Payer: COMMERCIAL

## 2024-12-30 VITALS
HEART RATE: 90 BPM | SYSTOLIC BLOOD PRESSURE: 118 MMHG | WEIGHT: 172 LBS | BODY MASS INDEX: 27.64 KG/M2 | HEIGHT: 66 IN | OXYGEN SATURATION: 98 % | DIASTOLIC BLOOD PRESSURE: 80 MMHG

## 2024-12-30 DIAGNOSIS — E34.8: ICD-10-CM

## 2024-12-30 DIAGNOSIS — R93.89 ABNORMAL CT SCAN: Primary | ICD-10-CM

## 2024-12-30 PROCEDURE — 99213 OFFICE O/P EST LOW 20 MIN: CPT | Performed by: STUDENT IN AN ORGANIZED HEALTH CARE EDUCATION/TRAINING PROGRAM

## 2024-12-30 SDOH — ECONOMIC STABILITY: INCOME INSECURITY: HOW HARD IS IT FOR YOU TO PAY FOR THE VERY BASICS LIKE FOOD, HOUSING, MEDICAL CARE, AND HEATING?: NOT HARD AT ALL

## 2024-12-30 SDOH — ECONOMIC STABILITY: FOOD INSECURITY: WITHIN THE PAST 12 MONTHS, THE FOOD YOU BOUGHT JUST DIDN'T LAST AND YOU DIDN'T HAVE MONEY TO GET MORE.: NEVER TRUE

## 2024-12-30 SDOH — ECONOMIC STABILITY: FOOD INSECURITY: WITHIN THE PAST 12 MONTHS, YOU WORRIED THAT YOUR FOOD WOULD RUN OUT BEFORE YOU GOT MONEY TO BUY MORE.: NEVER TRUE

## 2024-12-30 ASSESSMENT — PATIENT HEALTH QUESTIONNAIRE - PHQ9
SUM OF ALL RESPONSES TO PHQ QUESTIONS 1-9: 0
SUM OF ALL RESPONSES TO PHQ9 QUESTIONS 1 & 2: 0
1. LITTLE INTEREST OR PLEASURE IN DOING THINGS: NOT AT ALL
SUM OF ALL RESPONSES TO PHQ QUESTIONS 1-9: 0
SUM OF ALL RESPONSES TO PHQ QUESTIONS 1-9: 0
2. FEELING DOWN, DEPRESSED OR HOPELESS: NOT AT ALL
SUM OF ALL RESPONSES TO PHQ QUESTIONS 1-9: 0

## 2024-12-30 ASSESSMENT — ENCOUNTER SYMPTOMS
ABDOMINAL PAIN: 0
WHEEZING: 0
BACK PAIN: 0
NAUSEA: 0
DIARRHEA: 0
SINUS PAIN: 0
VOMITING: 0
RHINORRHEA: 0
COUGH: 0

## 2024-12-30 NOTE — PROGRESS NOTES
HPI Notes    Name: Jolie Robertson  : 1965         Chief Complaint:     Chief Complaint   Patient presents with    Pre-op Exam       History of Present Illness:      HPI    This is a 59 year old woman presenting to discuss abnormal CT results. She is scheduled for a dental implant and her oral surgeon, Dr. Estrada obtained a head CT, which allegedly was significant for an intracranial calcification and would like our opinion regarding this finding. Unfortunately no documentation, images, or OV note was sent for my review.  She has no headaches, diplopia, visual disturbances.     Past Medical History:     Past Medical History:   Diagnosis Date    Allergic rhinitis     Diverticulitis     PVC (premature ventricular contraction)     PVC (premature ventricular contraction)     Stress incontinence 2014    Urinary incontinence     Yeast vaginitis 2014      Reviewed all health maintenance requirements and ordered appropriate tests  Health Maintenance Due   Topic Date Due    HIV screen  Never done    Hepatitis C screen  Never done    DTaP/Tdap/Td vaccine (1 - Tdap) Never done    Cervical cancer screen  Never done    Shingles vaccine (1 of 2) Never done    A1C test (Diabetic or Prediabetic)  2014    Breast cancer screen  2020    Flu vaccine (1) 2024    COVID-19 Vaccine (2 -  season) 2024    Colorectal Cancer Screen  2025       Past Surgical History:     Past Surgical History:   Procedure Laterality Date    BREAST SURGERY      LUMP REMOVED FROM RIGHT BREAST    COLONOSCOPY  2011    DILATION AND CURETTAGE OF UTERUS      ENDOMETRIAL ABLATION      EYE SURGERY  2001 Lasik: 2019 Cataract    INCONTINENCE SURGERY      INTRACAPSULAR CATARACT EXTRACTION Right 06/10/2019    EYE CATARACT EMULSIFICATION IOL IMPLANT performed by Ray Cloud MD at [facility] OR    TONSILLECTOMY  1971    TUBAL LIGATION          Medications:       Prior to Admission medications

## 2024-12-31 ENCOUNTER — PATIENT MESSAGE (OUTPATIENT)
Dept: FAMILY MEDICINE CLINIC | Age: 59
End: 2024-12-31

## 2025-07-14 LAB
CHOLEST SERPL-MCNC: 208 MG/DL (ref 0–199)
CHOLESTEROL/HDL RATIO: 3.5
GLUCOSE SERPL-MCNC: 99 MG/DL (ref 74–99)
HDLC SERPL-MCNC: 60 MG/DL
LDLC SERPL CALC-MCNC: 127 MG/DL (ref 0–100)
TRIGL SERPL-MCNC: 106 MG/DL
VLDLC SERPL CALC-MCNC: 21 MG/DL (ref 1–30)

## (undated) DEVICE — BETADINE 5% EYE SOL

## (undated) DEVICE — 20 ML SYRINGE LUER-LOCK TIP: Brand: MONOJECT

## (undated) DEVICE — Device: Brand: ALLEGRO 1X SILICONE I/A HANDPIECE (6)

## (undated) DEVICE — SYRINGE MED 50ML LUERLOCK TIP

## (undated) DEVICE — SOLUTION IV IRRIG POUR BRL 0.9% SODIUM CHL 2F7124

## (undated) DEVICE — SOLUTION IV IRRIG WATER 1000ML POUR BRL 2F7114

## (undated) DEVICE — AIRLIFE™ NASAL OXYGEN CANNULA CURVED, FLARED TIP, WITH 7 FEET (2.1 M) CRUSH RESISTANT TUBING, OVER-THE-EAR STYLE: Brand: AIRLIFE™

## (undated) DEVICE — KNIFE OPHTH DIA22MM 45DEG SLT W HNDL SHRP ANG PNT DEL DBL

## (undated) DEVICE — CONTROL SYRINGE LUER-LOCK TIP: Brand: MONOJECT

## (undated) DEVICE — CARTRIDGE IMPLANTATION DISP FOR UNFOLDER PLAT 1 SER

## (undated) DEVICE — SOFT SHIELD® COLLAGEN SHIELD, 12 HOURS (CE): Brand: SOFT SHIELD® COLLAGEN SHIELDS

## (undated) DEVICE — Device